# Patient Record
Sex: FEMALE | Race: WHITE | NOT HISPANIC OR LATINO
[De-identification: names, ages, dates, MRNs, and addresses within clinical notes are randomized per-mention and may not be internally consistent; named-entity substitution may affect disease eponyms.]

---

## 2019-04-03 ENCOUNTER — ASOB RESULT (OUTPATIENT)
Age: 29
End: 2019-04-03

## 2019-04-03 ENCOUNTER — APPOINTMENT (OUTPATIENT)
Dept: ANTEPARTUM | Facility: CLINIC | Age: 29
End: 2019-04-03
Payer: COMMERCIAL

## 2019-04-03 PROCEDURE — 76801 OB US < 14 WKS SINGLE FETUS: CPT

## 2019-04-03 PROCEDURE — 36416 COLLJ CAPILLARY BLOOD SPEC: CPT

## 2019-04-03 PROCEDURE — 76813 OB US NUCHAL MEAS 1 GEST: CPT

## 2019-06-02 ENCOUNTER — OUTPATIENT (OUTPATIENT)
Dept: OUTPATIENT SERVICES | Facility: HOSPITAL | Age: 29
LOS: 1 days | Discharge: HOME | End: 2019-06-02
Payer: MEDICARE

## 2019-06-02 VITALS
RESPIRATION RATE: 16 BRPM | HEART RATE: 68 BPM | DIASTOLIC BLOOD PRESSURE: 58 MMHG | SYSTOLIC BLOOD PRESSURE: 117 MMHG | TEMPERATURE: 99 F

## 2019-06-02 DIAGNOSIS — Z90.49 ACQUIRED ABSENCE OF OTHER SPECIFIED PARTS OF DIGESTIVE TRACT: Chronic | ICD-10-CM

## 2019-06-02 LAB
APPEARANCE UR: ABNORMAL
BILIRUB UR-MCNC: NEGATIVE — SIGNIFICANT CHANGE UP
COLOR SPEC: YELLOW — SIGNIFICANT CHANGE UP
DIFF PNL FLD: ABNORMAL
EPI CELLS # UR: ABNORMAL /HPF
GLUCOSE UR QL: NEGATIVE MG/DL — SIGNIFICANT CHANGE UP
KETONES UR-MCNC: NEGATIVE — SIGNIFICANT CHANGE UP
LEUKOCYTE ESTERASE UR-ACNC: ABNORMAL
NITRITE UR-MCNC: NEGATIVE — SIGNIFICANT CHANGE UP
PH UR: 7 — SIGNIFICANT CHANGE UP (ref 5–8)
PROT UR-MCNC: ABNORMAL MG/DL
RBC CASTS # UR COMP ASSIST: ABNORMAL /HPF
SP GR SPEC: 1.02 — SIGNIFICANT CHANGE UP (ref 1.01–1.03)
UROBILINOGEN FLD QL: 0.2 MG/DL — SIGNIFICANT CHANGE UP (ref 0.2–0.2)
WBC UR QL: SIGNIFICANT CHANGE UP /HPF

## 2019-06-02 PROCEDURE — 76817 TRANSVAGINAL US OBSTETRIC: CPT | Mod: 26

## 2019-06-02 PROCEDURE — 99213 OFFICE O/P EST LOW 20 MIN: CPT | Mod: 25

## 2019-06-02 NOTE — OB PROVIDER TRIAGE NOTE - NSOBPROVIDERNOTE_OBGYN_ALL_OB_FT
29yo  at 20w3d GA, GBS unknown, Rh pos, with reassuring maternal and fetal status, with vaginal bleeding, now resolved, likely secondary to low lying placenta, not in  labor   - d/c home   - pelvic rest   -  labor precautions   - f/u UA, UCx, GC/Cl   - f/u next scheduled appt     Dr. Palmer and Dr. Nevarez aware. 29yo  at 20w3d GA, GBS unknown, Rh pos, with reassuring maternal and fetal status, with vaginal bleeding, now resolved, likely secondary to low lying placenta, not in  labor   - d/c home   - pelvic rest   -  labor precautions   - f/u UA, UCx, GC/Cl   - f/u next scheduled appt     Dr. Palmer and Dr. Nevarez aware.  As above, patient evaluated by me as well, low lying placenta, rh +, precautions reiterated, gc/ct/ucx sent, patient to call for results

## 2019-06-02 NOTE — OB PROVIDER TRIAGE NOTE - HISTORY OF PRESENT ILLNESS
29yo  at 20w3d GA, by LMP c/w 1st trim sono, c/o bright red vaginal bleeding, noted on her underwear on waking and then in the toilet bowl. Soaked less than 1/4 pad in 2 hours, currently denies active bleeding. C/o occasional lower abdominal cramping, not painful, not exacerbated. Denies LOF. Good FM. Denies fever, chills, nausea/vomiting, diarrhea/constipation, dysuria, urgency, frequency. Denies dizziness/lightheadedness/CP/SOB/palpitations. Last intercourse yesterday. Last BM yesterday. Last PO intake at . Has not yet had anatomy scan. Denies other complications during this pregnancy.

## 2019-06-02 NOTE — OB PROVIDER TRIAGE NOTE - NS_OBGYNHISTORY_OBGYN_ALL_OB_FT
ObHx: etopX1, no D&C     GynHx: h/o abnormal pap years ago, normal paps since then. Denies h/o fibroids, ovarian cysts, STIs.

## 2019-06-02 NOTE — OB PROVIDER TRIAGE NOTE - NSHPPHYSICALEXAM_GEN_ALL_CORE
Vital Signs Last 24 Hrs  T(C): 37.2 (02 Jun 2019 07:38), Max: 37.2 (02 Jun 2019 07:38)  T(F): 99 (02 Jun 2019 07:38), Max: 99 (02 Jun 2019 07:38)  HR: 68 (02 Jun 2019 07:38) (68 - 68)  BP: 117/58 (02 Jun 2019 07:38) (117/58 - 117/58)  RR: 16 (02 Jun 2019 07:38) (16 - 16)    EFM/Ulmer: deferred     Udip: pos nitrites, mod leuks, large blood     Abd: soft, gravid, nontender, no palpable ctx  Speculum: no active bleeding per os, cervix appears normal, appears closed   SVE: deferred

## 2019-06-03 LAB
C TRACH RRNA SPEC QL NAA+PROBE: SIGNIFICANT CHANGE UP
CULTURE RESULTS: SIGNIFICANT CHANGE UP
N GONORRHOEA RRNA SPEC QL NAA+PROBE: SIGNIFICANT CHANGE UP
SPECIMEN SOURCE: SIGNIFICANT CHANGE UP
SPECIMEN SOURCE: SIGNIFICANT CHANGE UP

## 2019-06-05 ENCOUNTER — APPOINTMENT (OUTPATIENT)
Dept: ANTEPARTUM | Facility: CLINIC | Age: 29
End: 2019-06-05
Payer: COMMERCIAL

## 2019-06-05 ENCOUNTER — ASOB RESULT (OUTPATIENT)
Age: 29
End: 2019-06-05

## 2019-06-05 PROCEDURE — 76811 OB US DETAILED SNGL FETUS: CPT

## 2019-06-05 PROCEDURE — 76817 TRANSVAGINAL US OBSTETRIC: CPT | Mod: 59

## 2019-10-15 ENCOUNTER — INPATIENT (INPATIENT)
Facility: HOSPITAL | Age: 29
LOS: 3 days | Discharge: HOME | End: 2019-10-19
Attending: OBSTETRICS & GYNECOLOGY | Admitting: OBSTETRICS & GYNECOLOGY
Payer: COMMERCIAL

## 2019-10-15 VITALS
DIASTOLIC BLOOD PRESSURE: 67 MMHG | SYSTOLIC BLOOD PRESSURE: 107 MMHG | TEMPERATURE: 98 F | HEART RATE: 80 BPM | RESPIRATION RATE: 16 BRPM | WEIGHT: 207.9 LBS | HEIGHT: 64 IN

## 2019-10-15 DIAGNOSIS — Z98.890 OTHER SPECIFIED POSTPROCEDURAL STATES: Chronic | ICD-10-CM

## 2019-10-15 DIAGNOSIS — Z90.49 ACQUIRED ABSENCE OF OTHER SPECIFIED PARTS OF DIGESTIVE TRACT: Chronic | ICD-10-CM

## 2019-10-15 LAB
APPEARANCE UR: ABNORMAL
BACTERIA # UR AUTO: ABNORMAL
BASOPHILS # BLD AUTO: 0.02 K/UL — SIGNIFICANT CHANGE UP (ref 0–0.2)
BASOPHILS NFR BLD AUTO: 0.2 % — SIGNIFICANT CHANGE UP (ref 0–1)
BILIRUB UR-MCNC: NEGATIVE — SIGNIFICANT CHANGE UP
BLD GP AB SCN SERPL QL: SIGNIFICANT CHANGE UP
COLOR SPEC: SIGNIFICANT CHANGE UP
DIFF PNL FLD: NEGATIVE — SIGNIFICANT CHANGE UP
EOSINOPHIL # BLD AUTO: 0.15 K/UL — SIGNIFICANT CHANGE UP (ref 0–0.7)
EOSINOPHIL NFR BLD AUTO: 1.4 % — SIGNIFICANT CHANGE UP (ref 0–8)
EPI CELLS # UR: 3 /HPF — SIGNIFICANT CHANGE UP (ref 0–5)
GLUCOSE BLDC GLUCOMTR-MCNC: 125 MG/DL — HIGH (ref 70–99)
GLUCOSE UR QL: NEGATIVE — SIGNIFICANT CHANGE UP
HCT VFR BLD CALC: 35.8 % — LOW (ref 37–47)
HGB BLD-MCNC: 12.4 G/DL — SIGNIFICANT CHANGE UP (ref 12–16)
HYALINE CASTS # UR AUTO: 1 /LPF — SIGNIFICANT CHANGE UP (ref 0–7)
IMM GRANULOCYTES NFR BLD AUTO: 0.6 % — HIGH (ref 0.1–0.3)
KETONES UR-MCNC: NEGATIVE — SIGNIFICANT CHANGE UP
LEUKOCYTE ESTERASE UR-ACNC: ABNORMAL
LYMPHOCYTES # BLD AUTO: 2.53 K/UL — SIGNIFICANT CHANGE UP (ref 1.2–3.4)
LYMPHOCYTES # BLD AUTO: 23.7 % — SIGNIFICANT CHANGE UP (ref 20.5–51.1)
MCHC RBC-ENTMCNC: 31.8 PG — HIGH (ref 27–31)
MCHC RBC-ENTMCNC: 34.6 G/DL — SIGNIFICANT CHANGE UP (ref 32–37)
MCV RBC AUTO: 91.8 FL — SIGNIFICANT CHANGE UP (ref 81–99)
MONOCYTES # BLD AUTO: 0.76 K/UL — HIGH (ref 0.1–0.6)
MONOCYTES NFR BLD AUTO: 7.1 % — SIGNIFICANT CHANGE UP (ref 1.7–9.3)
NEUTROPHILS # BLD AUTO: 7.16 K/UL — HIGH (ref 1.4–6.5)
NEUTROPHILS NFR BLD AUTO: 67 % — SIGNIFICANT CHANGE UP (ref 42.2–75.2)
NITRITE UR-MCNC: NEGATIVE — SIGNIFICANT CHANGE UP
NRBC # BLD: 0 /100 WBCS — SIGNIFICANT CHANGE UP (ref 0–0)
PH UR: 6.5 — SIGNIFICANT CHANGE UP (ref 5–8)
PLATELET # BLD AUTO: 193 K/UL — SIGNIFICANT CHANGE UP (ref 130–400)
PRENATAL SYPHILIS TEST: SIGNIFICANT CHANGE UP
PROT UR-MCNC: NEGATIVE — SIGNIFICANT CHANGE UP
RBC # BLD: 3.9 M/UL — LOW (ref 4.2–5.4)
RBC # FLD: 13.3 % — SIGNIFICANT CHANGE UP (ref 11.5–14.5)
RBC CASTS # UR COMP ASSIST: 2 /HPF — SIGNIFICANT CHANGE UP (ref 0–4)
SP GR SPEC: 1.01 — LOW (ref 1.01–1.02)
UROBILINOGEN FLD QL: SIGNIFICANT CHANGE UP
WBC # BLD: 10.68 K/UL — SIGNIFICANT CHANGE UP (ref 4.8–10.8)
WBC # FLD AUTO: 10.68 K/UL — SIGNIFICANT CHANGE UP (ref 4.8–10.8)
WBC UR QL: 120 /HPF — HIGH (ref 0–5)

## 2019-10-15 RX ORDER — SODIUM CHLORIDE 9 MG/ML
1000 INJECTION, SOLUTION INTRAVENOUS
Refills: 0 | Status: DISCONTINUED | OUTPATIENT
Start: 2019-10-15 | End: 2019-10-17

## 2019-10-15 RX ORDER — INFLUENZA VIRUS VACCINE 15; 15; 15; 15 UG/.5ML; UG/.5ML; UG/.5ML; UG/.5ML
0.5 SUSPENSION INTRAMUSCULAR ONCE
Refills: 0 | Status: COMPLETED | OUTPATIENT
Start: 2019-10-15 | End: 2019-10-19

## 2019-10-15 RX ORDER — OXYTOCIN 10 UNIT/ML
41.67 VIAL (ML) INJECTION
Qty: 20 | Refills: 0 | Status: DISCONTINUED | OUTPATIENT
Start: 2019-10-15 | End: 2019-10-17

## 2019-10-15 RX ORDER — AMPICILLIN TRIHYDRATE 250 MG
2 CAPSULE ORAL ONCE
Refills: 0 | Status: COMPLETED | OUTPATIENT
Start: 2019-10-15 | End: 2019-10-15

## 2019-10-15 RX ORDER — DINOPROSTONE 10 MG/241MG
10 INSERT VAGINAL ONCE
Refills: 0 | Status: COMPLETED | OUTPATIENT
Start: 2019-10-15 | End: 2019-10-15

## 2019-10-15 RX ORDER — AMPICILLIN TRIHYDRATE 250 MG
1 CAPSULE ORAL EVERY 4 HOURS
Refills: 0 | Status: DISCONTINUED | OUTPATIENT
Start: 2019-10-15 | End: 2019-10-17

## 2019-10-15 RX ORDER — HUMAN INSULIN 100 [IU]/ML
4 INJECTION, SUSPENSION SUBCUTANEOUS ONCE
Refills: 0 | Status: COMPLETED | OUTPATIENT
Start: 2019-10-15 | End: 2019-10-15

## 2019-10-15 RX ORDER — VALACYCLOVIR 500 MG/1
500 TABLET, FILM COATED ORAL
Refills: 0 | Status: DISCONTINUED | OUTPATIENT
Start: 2019-10-15 | End: 2019-10-15

## 2019-10-15 RX ADMIN — Medication 216 GRAM(S): at 23:11

## 2019-10-15 RX ADMIN — HUMAN INSULIN 4 UNIT(S): 100 INJECTION, SUSPENSION SUBCUTANEOUS at 23:10

## 2019-10-15 RX ADMIN — SODIUM CHLORIDE 125 MILLILITER(S): 9 INJECTION, SOLUTION INTRAVENOUS at 21:45

## 2019-10-15 RX ADMIN — DINOPROSTONE 10 MILLIGRAM(S): 10 INSERT VAGINAL at 23:02

## 2019-10-15 NOTE — OB PROVIDER H&P - NSHPLABSRESULTS_GEN_ALL_CORE
02/21/19  Measles immune  Mumps immune    6/29/19  182mg/dL    7/21/19  GTT: 72/109/108/53    [place]  @7.6wks SIUP, CRL 7.6wks, FH+, nl ovaries  @11.6wks: NT 1.4m  @20.6wks: EFW 79%, post placenta, AFV nl, cvx 3.5cm, normal fetal anatomy  @28wks: EFW 1269g (65%), MVP 4.3cm,  @32wks: EFW 1942g (48%)  @35.4wks: EFW 2822g (61%), MVP 3.7cm  @36.4wks: MVP 6cm  @37wks: MVP 5cm  @38wks: MVP 4.5cm, EFW 3669g (84%), vtx 02/21/19  Measles immune  Mumps immune    6/29/19  GCT: 182mg/dL    7/21/19  GTT: 72/109/108/53    [place]  @7.6wks SIUP, CRL 7.6wks, FH+, nl ovaries  @11.6wks: NT 1.4m  @20.6wks: EFW 79%, post placenta, AFV nl, cvx 3.5cm, normal fetal anatomy  @28wks: EFW 1269g (65%), MVP 4.3cm,  @32wks: EFW 1942g (48%)  @35.4wks: EFW 2822g (61%), MVP 3.7cm  @36.4wks: MVP 6cm  @37wks: MVP 5cm  @38wks: MVP 4.5cm, EFW 3669g (84%), vtx

## 2019-10-15 NOTE — OB PROVIDER H&P - NS_OBGYNHISTORY_OBGYN_ALL_OB_FT
OB Hx  ETOP x 1    GYN Hx  HSV-I pos, on valtrex  H/O HPV, s/p colpo on 11/18- biopsy neg OB Hx  ETOP x 1 in 2008 with D&C    GYN Hx  HSV-I pos, on valtrex BID since 36 weeks, last outbreak 01/2019  H/O HPV, s/p colpo on 11/18- biopsy neg  Denies ovarian cysts, fibroids.

## 2019-10-15 NOTE — OB PROVIDER H&P - HISTORY OF PRESENT ILLNESS
30yo  @39w5d, REYNA 10/17/19 by LMP c/w first trimester sonogram presents to L&D for IOL for GDMA2 on insulin. 28yo  @39w5d, REYNA 10/17/19 by LMP c/w first trimester sonogram presents to L&D for IOL for GDMA2 on insulin. She reports fasting FS are 70-80, 2hr PP range 110-140, she takes NPH 8 units in the morning, and 8 units in bedtime. Her last dose was this morning. She denies contractions, VB, LOF. Reports good FM. She has h/o HSV-1, last outbreak was during the beginning of this pregnancy, she took Valtrex for a few weeks. She denies current burning sensation or outbreaks. She resumed Valtrex at 36wks, she takes it BID, she already took both doses today. She denies any other issues during this gestation.

## 2019-10-15 NOTE — OB PROVIDER H&P - ASSESSMENT
28yo  @39w5d, GBS pos, h/o HSV-1 on valtrex, h/o HPV pos s/p colpo with neg biopsy, GDMA2 on insulin, for IOL    -cervidil for IOL  -admit to L&D  -admission labs  -clear liquids, IV hydration  -continuous EFM/toco  -FS q4 hrs while in latent labor  -Valtrex ordered  -pain management PRN  -Sliding scale for blood glucose control    Dr. Jorge and Dr. Cohn to be made aware 30yo  @39w5d, GBS pos, h/o HSV-1 on valtrex, h/o HPV pos s/p colpo with neg biopsy, GDMA2 on insulin, for IOL    -cervidil for IOL  -admit to L&D  -admission labs  -clear liquids, IV hydration  -continuous EFM/toco  -FS q4 hrs while in latent labor  -pain management PRN  -Sliding scale for blood glucose control    Dr. Jorge and Dr. Cohn to be made aware

## 2019-10-15 NOTE — OB PROVIDER H&P - NS_PELVICEXAM_OBGYN_ALL_OB
[facility] - 19 EGA - 28.0     - Report received from PUJA Ashley RN. Pt resting comfortably at this time.    Dr. Ramirez at bedside, discussed POC with pt. Pt agrees.   Pt states she was able to tolerate saltines at this time. Pt states her nausea has come back a little but she has not vomited during this shift.   Dr. Ramirez reviewed labs at this time, states pt may be discharged home at this time.   IV removed. Discharge instructions given at this time, including when to return to the hospital. Pt verbalizes understanding at this time, all questions answered.   Pt ambulated off the unit in stable condition at this time with personal belongings in place.   Yes

## 2019-10-15 NOTE — OB PROVIDER H&P - NSHPPHYSICALEXAM_GEN_ALL_CORE
Vital Signs Last 24 Hrs  T(C): 36.9 (15 Oct 2019 21:36), Max: 36.9 (15 Oct 2019 21:36)  T(F): 98.4 (15 Oct 2019 21:36), Max: 98.4 (15 Oct 2019 21:36)  HR: 64 (15 Oct 2019 22:15) (64 - 80)  BP: 107/61 (15 Oct 2019 22:15) (107/6 - 107/61)  RR: 16 (15 Oct 2019 21:36) (16 - 16)    POCT Blood Glucose.: 125 mg/dL (15 Oct 2019 21:33)    Gen: AAOx3, NAD  Abd: soft, nontender, gravid, no palpable contractions  EFM: 130/mod petty/+accels  [place]: no contractions  SVE: 0/0/-3, vtx, intact   Speculum: physiologic discharge, cvx closed, no lesions noted  Sono: cephalic  EFW by Leopold's: 8lb

## 2019-10-16 PROBLEM — Z78.9 OTHER SPECIFIED HEALTH STATUS: Chronic | Status: ACTIVE | Noted: 2019-06-02

## 2019-10-16 LAB
GLUCOSE BLDC GLUCOMTR-MCNC: 105 MG/DL — HIGH (ref 70–99)
GLUCOSE BLDC GLUCOMTR-MCNC: 108 MG/DL — HIGH (ref 70–99)
GLUCOSE BLDC GLUCOMTR-MCNC: 68 MG/DL — LOW (ref 70–99)
GLUCOSE BLDC GLUCOMTR-MCNC: 71 MG/DL — SIGNIFICANT CHANGE UP (ref 70–99)
GLUCOSE BLDC GLUCOMTR-MCNC: 77 MG/DL — SIGNIFICANT CHANGE UP (ref 70–99)

## 2019-10-16 RX ORDER — ACETAMINOPHEN 500 MG
650 TABLET ORAL EVERY 6 HOURS
Refills: 0 | Status: DISCONTINUED | OUTPATIENT
Start: 2019-10-16 | End: 2019-10-16

## 2019-10-16 RX ORDER — ACETAMINOPHEN 500 MG
650 TABLET ORAL ONCE
Refills: 0 | Status: COMPLETED | OUTPATIENT
Start: 2019-10-16 | End: 2019-10-16

## 2019-10-16 RX ORDER — BUTORPHANOL TARTRATE 2 MG/ML
2 INJECTION, SOLUTION INTRAMUSCULAR; INTRAVENOUS ONCE
Refills: 0 | Status: DISCONTINUED | OUTPATIENT
Start: 2019-10-16 | End: 2019-10-16

## 2019-10-16 RX ORDER — DIPHENHYDRAMINE HCL 50 MG
25 CAPSULE ORAL ONCE
Refills: 0 | Status: COMPLETED | OUTPATIENT
Start: 2019-10-16 | End: 2019-10-16

## 2019-10-16 RX ORDER — DINOPROSTONE 10 MG/241MG
10 INSERT VAGINAL ONCE
Refills: 0 | Status: COMPLETED | OUTPATIENT
Start: 2019-10-16 | End: 2019-10-16

## 2019-10-16 RX ADMIN — Medication 108 GRAM(S): at 15:00

## 2019-10-16 RX ADMIN — Medication 108 GRAM(S): at 06:58

## 2019-10-16 RX ADMIN — Medication 108 GRAM(S): at 18:48

## 2019-10-16 RX ADMIN — Medication 25 MILLIGRAM(S): at 21:46

## 2019-10-16 RX ADMIN — DINOPROSTONE 10 MILLIGRAM(S): 10 INSERT VAGINAL at 15:17

## 2019-10-16 RX ADMIN — Medication 108 GRAM(S): at 02:59

## 2019-10-16 RX ADMIN — BUTORPHANOL TARTRATE 2 MILLIGRAM(S): 2 INJECTION, SOLUTION INTRAMUSCULAR; INTRAVENOUS at 21:45

## 2019-10-16 RX ADMIN — BUTORPHANOL TARTRATE 2 MILLIGRAM(S): 2 INJECTION, SOLUTION INTRAMUSCULAR; INTRAVENOUS at 23:46

## 2019-10-16 RX ADMIN — Medication 108 GRAM(S): at 11:06

## 2019-10-16 RX ADMIN — Medication 108 GRAM(S): at 22:54

## 2019-10-16 RX ADMIN — Medication 650 MILLIGRAM(S): at 02:48

## 2019-10-16 NOTE — PROGRESS NOTE ADULT - SUBJECTIVE AND OBJECTIVE BOX
PGY-2 Note    Patient seen at bedside. No complaints at the moment.    T(F): 98.42 (10-16 @ 15:07), Max: 98.42 (10-16 @ 15:07)  HR: 76 (10-16 @ 15:05)  BP: 102/- (10-16 @ 15:05) (95/53 - 120/64)  RR: 18 (10-16 @ 15:07)  EFM: 120bpm/mod petty/ accels+  TOCO: irregular  SVE: last exam a 1100, s/p Cervadil #1, 1/long/-3    Medications:  (Floorstock): 2 (10-16 @ 02:40)  acetaminophen   Tablet ..: 650 (10-16 @ 02:48)  ampicillin  IVPB: 216 (10-15 @ 23:11)  ampicillin  IVPB: 108 (10-16 @ 15:00),  108 (10-16 @ 11:06),  108 (10-16 @ 06:58),  108 (10-16 @ 02:59)  dinoprostone Insert: 10 (10-15 @ 23:02)  insulin NPH human recombinant: 4 (10-15 @ 23:10)  lactated ringers.: 125 (10-15 @ 21:37)      Labs:                        12.4   10.68 )-----------( 193      ( 15 Oct 2019 21:38 )             35.8           Prenatal Syphilis Test: Nonreact (10-15 @ 21:38)  Antibody Screen: NEG (10-15-19 @ 21:38)    Urinalysis Basic - ( 15 Oct 2019 21:38 )  Color: Light Yellow / Appearance: Slightly Turbid / S.008 / pH: x  Gluc: x / Ketone: Negative  / Bili: Negative / Urobili: <2 mg/dL   Blood: x / Protein: Negative / Nitrite: Negative   Leuk Esterase: Large / RBC: 2 /HPF /  /HPF   Sq Epi: x / Non Sq Epi: 3 /HPF / Bacteria: Moderate

## 2019-10-16 NOTE — PROGRESS NOTE ADULT - SUBJECTIVE AND OBJECTIVE BOX
PGY1 Note    Patient seen at bedside for evaluation of labor progression and evaluation of FHR baseline between 100-110. She was given fluid bolus and repositioned, with good increase in baseline to 110-115. Doing well, no complaints.     Vital Signs Last 24 Hrs  T(C): 36.9 (15 Oct 2019 21:36), Max: 36.9 (15 Oct 2019 21:36)  T(F): 98.4 (15 Oct 2019 21:36), Max: 98.4 (15 Oct 2019 21:36)  HR: 53 (16 Oct 2019 02:46) (53 - 80)  BP: 100/58 (16 Oct 2019 02:46) (100/58 - 120/64)  RR: 16 (15 Oct 2019 21:36) (16 - 16)    EFM: 110/mod petty/+accels  TOCO: occasional contractions  SVE: 0/0/-3, vtx by sono, intact @2200    Medications:  (Floorstock): 2 Each (10-16-19 @ 02:40)  acetaminophen   Tablet ..: 650 milliGRAM(s) (10-16-19 @ 02:48)  ampicillin  IVPB: 216 mL/Hr (10-15-19 @ 23:11)  ampicillin  IVPB: 108 mL/Hr (10-16-19 @ 02:59)  dinoprostone Insert: 10 milliGRAM(s) (10-15-19 @ 23:02)  insulin NPH human recombinant: 4 Unit(s) (10-15-19 @ 23:10)  lactated ringers.: 125 mL/Hr (10-15-19 @ 21:37)      Labs:                        12.4   10.68 )-----------( 193      ( 15 Oct 2019 21:38 )             35.8           ABO RH Interpretation: B POS (10-15-19 @ 21:38)    Antibody Screen: NEG (10-15-19 @ 21:38)    Urinalysis Basic - ( 15 Oct 2019 21:38 )    Color: Light Yellow / Appearance: Slightly Turbid / S.008 / pH: x  Gluc: x / Ketone: Negative  / Bili: Negative / Urobili: <2 mg/dL   Blood: x / Protein: Negative / Nitrite: Negative   Leuk Esterase: Large / RBC: 2 /HPF /  /HPF   Sq Epi: x / Non Sq Epi: 3 /HPF / Bacteria: Moderate      Prenatal Syphilis Test: Nonreact (10-15-19 @ 21:38)      POCT Blood Glucose.: 77 mg/dL (16 Oct 2019 03:34)  POCT Blood Glucose.: 77 mg/dL (16 Oct 2019 01:21)  POCT Blood Glucose.: 125 mg/dL (15 Oct 2019 21:33)

## 2019-10-16 NOTE — CHART NOTE - NSCHARTNOTEFT_GEN_A_CORE
Weds  10/16/2019  16:38    Patient's chart, notes and labs reviewed.      IOL for GDMA2 on Insulin  h/p HSV-1 on Valtrex  s/p Cervidil #2 @ 15:00  1/Long/-3    Comfortable                          12.4   10.68 )-----------( 193      ( 15 Oct 2019 21:38 )             35.8     Admission labs acceptable for consideration of Neuraxial Nerve Block for Labor Analgesia/Anesthesia.  Patient has not requested for any Neuraxial Analgesia yet.  Will remain readily available. Weds  10/16/2019  16:38    Patient's chart, notes and labs reviewed.      39 + 5 weeks  IOL for GDMA2 on Insulin  h/p HSV-1 on Valtrex  s/p Cervidil #2 @ 15:00  /Long/-3    Comfortable                          12.4   10.68 )-----------( 193      ( 15 Oct 2019 21:38 )             35.8     Admission labs acceptable for consideration of Neuraxial Nerve Block for Labor Analgesia/Anesthesia.  Patient has not requested for any Neuraxial Analgesia yet.  Will remain readily available.

## 2019-10-16 NOTE — PROGRESS NOTE ADULT - ASSESSMENT
30yo  @39w6d, GBS pos, h/o HSV-1 on valtrex, h/o HPV pos s/p colpo with neg biopsy, GDMA2 on insulin, for IOL, s/p Cervidil removal.  -Cervadil #2 at 1500  -Continue current management   -Pain management prn  -Continuous EFM/toco  -FS q4 hours   -Reevaluate     Dr. Downey aware, Dr. Castro aware

## 2019-10-16 NOTE — PROGRESS NOTE ADULT - ASSESSMENT
30yo  @39w6d, GBS pos, h/o HSV-1 on valtrex, h/o HPV pos s/p colpo with neg biopsy, GDMA2 on insulin, for IOL, s/p Cervidil insertion    -clear liquids, IV hydration  -continuous EFM/toco  -FS q4 hrs while in latent labor  -pain management PRN  -Sliding scale for blood glucose control    Dr. Jorge and Dr. Cohn aware

## 2019-10-16 NOTE — PROGRESS NOTE ADULT - SUBJECTIVE AND OBJECTIVE BOX
Patient seen at bedside, resting comfortably. No complaints at this time. IOL for GDMA2.     T(C): 36.5 (10-16-19 @ 07:21), Max: 36.9 (10-15-19 @ 21:36)  HR: 65 (10-16-19 @ 10:45) (53 - 80)  BP: 112/65 (10-16-19 @ 10:45) (95/53 - 120/64)  RR: 18 (10-16-19 @ 07:21) (16 - 18)    EFM: 130/mod/+accels  TOCO: q 4-5 mins  SVE: cervidil removed without difficulty. 1/long/-3    Meds:  ampicillin  IVPB 1 Gram(s) IV Intermittent every 4 hours  influenza   Vaccine 0.5 milliLiter(s) IntraMuscular once  lactated ringers. 1000 milliLiter(s) IV Continuous <Continuous>  oxytocin Infusion 41.667 milliUNIT(s)/Min IV Continuous <Continuous>      Labs:  none new    A/P: +  28yo  @39w6d, GBS pos, h/o HSV-1 on valtrex, h/o HPV pos s/p colpo with neg biopsy, GDMA2 on insulin, for IOL, s/p Cervidil insertion    Plan:  Continue EFM/TOCO  Continue IV hydration  Continue IV antibiotics  FS q4 hrs while in latent labor  Ambulate, shower, eat  Pain management PRN  Sliding scale for blood glucose control  Re-insert Cervidil #2 in the afternoon when Dr. Downey arrives    Dr. Downey aware

## 2019-10-17 LAB
GLUCOSE BLDC GLUCOMTR-MCNC: 104 MG/DL — HIGH (ref 70–99)
GLUCOSE BLDC GLUCOMTR-MCNC: 69 MG/DL — LOW (ref 70–99)
GLUCOSE BLDC GLUCOMTR-MCNC: 76 MG/DL — SIGNIFICANT CHANGE UP (ref 70–99)
GLUCOSE BLDC GLUCOMTR-MCNC: 79 MG/DL — SIGNIFICANT CHANGE UP (ref 70–99)

## 2019-10-17 PROCEDURE — 59400 OBSTETRICAL CARE: CPT

## 2019-10-17 RX ORDER — ONDANSETRON 8 MG/1
4 TABLET, FILM COATED ORAL EVERY 6 HOURS
Refills: 0 | Status: DISCONTINUED | OUTPATIENT
Start: 2019-10-17 | End: 2019-10-17

## 2019-10-17 RX ORDER — LANOLIN
1 OINTMENT (GRAM) TOPICAL EVERY 6 HOURS
Refills: 0 | Status: DISCONTINUED | OUTPATIENT
Start: 2019-10-17 | End: 2019-10-19

## 2019-10-17 RX ORDER — GLYCERIN ADULT
1 SUPPOSITORY, RECTAL RECTAL AT BEDTIME
Refills: 0 | Status: DISCONTINUED | OUTPATIENT
Start: 2019-10-17 | End: 2019-10-19

## 2019-10-17 RX ORDER — NALOXONE HYDROCHLORIDE 4 MG/.1ML
0.1 SPRAY NASAL
Refills: 0 | Status: DISCONTINUED | OUTPATIENT
Start: 2019-10-17 | End: 2019-10-17

## 2019-10-17 RX ORDER — DIPHENHYDRAMINE HCL 50 MG
25 CAPSULE ORAL EVERY 6 HOURS
Refills: 0 | Status: DISCONTINUED | OUTPATIENT
Start: 2019-10-17 | End: 2019-10-19

## 2019-10-17 RX ORDER — OXYCODONE HYDROCHLORIDE 5 MG/1
5 TABLET ORAL ONCE
Refills: 0 | Status: DISCONTINUED | OUTPATIENT
Start: 2019-10-17 | End: 2019-10-19

## 2019-10-17 RX ORDER — ACETAMINOPHEN 500 MG
975 TABLET ORAL
Refills: 0 | Status: DISCONTINUED | OUTPATIENT
Start: 2019-10-17 | End: 2019-10-19

## 2019-10-17 RX ORDER — MAGNESIUM HYDROXIDE 400 MG/1
30 TABLET, CHEWABLE ORAL
Refills: 0 | Status: DISCONTINUED | OUTPATIENT
Start: 2019-10-17 | End: 2019-10-19

## 2019-10-17 RX ORDER — ACETAMINOPHEN 500 MG
650 TABLET ORAL ONCE
Refills: 0 | Status: COMPLETED | OUTPATIENT
Start: 2019-10-17 | End: 2019-10-17

## 2019-10-17 RX ORDER — DIBUCAINE 1 %
1 OINTMENT (GRAM) RECTAL EVERY 6 HOURS
Refills: 0 | Status: DISCONTINUED | OUTPATIENT
Start: 2019-10-17 | End: 2019-10-19

## 2019-10-17 RX ORDER — AER TRAVELER 0.5 G/1
1 SOLUTION RECTAL; TOPICAL EVERY 4 HOURS
Refills: 0 | Status: DISCONTINUED | OUTPATIENT
Start: 2019-10-17 | End: 2019-10-19

## 2019-10-17 RX ORDER — DOCUSATE SODIUM 100 MG
100 CAPSULE ORAL
Refills: 0 | Status: DISCONTINUED | OUTPATIENT
Start: 2019-10-17 | End: 2019-10-19

## 2019-10-17 RX ORDER — OXYTOCIN 10 UNIT/ML
41.67 VIAL (ML) INJECTION
Qty: 20 | Refills: 0 | Status: DISCONTINUED | OUTPATIENT
Start: 2019-10-17 | End: 2019-10-19

## 2019-10-17 RX ORDER — IBUPROFEN 200 MG
600 TABLET ORAL EVERY 6 HOURS
Refills: 0 | Status: COMPLETED | OUTPATIENT
Start: 2019-10-17 | End: 2020-09-14

## 2019-10-17 RX ORDER — SIMETHICONE 80 MG/1
80 TABLET, CHEWABLE ORAL EVERY 4 HOURS
Refills: 0 | Status: DISCONTINUED | OUTPATIENT
Start: 2019-10-17 | End: 2019-10-19

## 2019-10-17 RX ORDER — BENZOCAINE 10 %
1 GEL (GRAM) MUCOUS MEMBRANE EVERY 6 HOURS
Refills: 0 | Status: DISCONTINUED | OUTPATIENT
Start: 2019-10-17 | End: 2019-10-19

## 2019-10-17 RX ORDER — HYDROCORTISONE 1 %
1 OINTMENT (GRAM) TOPICAL EVERY 6 HOURS
Refills: 0 | Status: DISCONTINUED | OUTPATIENT
Start: 2019-10-17 | End: 2019-10-19

## 2019-10-17 RX ORDER — OXYTOCIN 10 UNIT/ML
2 VIAL (ML) INJECTION
Qty: 30 | Refills: 0 | Status: DISCONTINUED | OUTPATIENT
Start: 2019-10-17 | End: 2019-10-17

## 2019-10-17 RX ORDER — OXYCODONE HYDROCHLORIDE 5 MG/1
5 TABLET ORAL
Refills: 0 | Status: DISCONTINUED | OUTPATIENT
Start: 2019-10-17 | End: 2019-10-19

## 2019-10-17 RX ORDER — KETOROLAC TROMETHAMINE 30 MG/ML
30 SYRINGE (ML) INJECTION ONCE
Refills: 0 | Status: DISCONTINUED | OUTPATIENT
Start: 2019-10-17 | End: 2019-10-17

## 2019-10-17 RX ADMIN — Medication 108 GRAM(S): at 03:00

## 2019-10-17 RX ADMIN — Medication 108 GRAM(S): at 14:55

## 2019-10-17 RX ADMIN — Medication 2 MILLIUNIT(S)/MIN: at 07:40

## 2019-10-17 RX ADMIN — Medication 108 GRAM(S): at 18:47

## 2019-10-17 RX ADMIN — Medication 30 MILLIGRAM(S): at 21:05

## 2019-10-17 RX ADMIN — Medication 650 MILLIGRAM(S): at 17:31

## 2019-10-17 RX ADMIN — Medication 108 GRAM(S): at 10:45

## 2019-10-17 RX ADMIN — Medication 30 MILLIGRAM(S): at 21:53

## 2019-10-17 RX ADMIN — Medication 650 MILLIGRAM(S): at 18:49

## 2019-10-17 RX ADMIN — Medication 108 GRAM(S): at 06:40

## 2019-10-17 NOTE — PROGRESS NOTE ADULT - SUBJECTIVE AND OBJECTIVE BOX
Patient evaluated at bedside, doing well. no complaints.     T(F): 97.88 (07:28)  HR: 75 (10:14)  BP: 101/55 (10:06)  RR: 16 (07:28)    SVE:  3-4/80/-1 @ 0720 by Dr. Downey  EFM:  120 moderate variability, Cat I  TOCO:  q 3-5 min    Medications:  Pitocin @ 12 milliunits        Labs:                        12.4   10.68 )-----------( 193      ( 15 Oct 2019 21:38 )             35.8             Urinalysis Basic - ( 15 Oct 2019 21:38 )    Color: Light Yellow / Appearance: Slightly Turbid / S.008 / pH: x  Gluc: x / Ketone: Negative  / Bili: Negative / Urobili: <2 mg/dL   Blood: x / Protein: Negative / Nitrite: Negative   Leuk Esterase: Large / RBC: 2 /HPF /  /HPF   Sq Epi: x / Non Sq Epi: 3 /HPF / Bacteria: Moderate

## 2019-10-17 NOTE — OB PROVIDER IHI INDUCTION/AUGMENTATION NOTE - NS_OBIHIREPANPSATTEST_OBGYN_ALL_OB
I have discussed with the Attending the patient's status, as well as the H&P and the fetal status. The Attending agreed with the suggested management.
I have discussed with the Attending the patient's status, as well as the H&P and the fetal status. The Attending agreed with the suggested management.

## 2019-10-17 NOTE — PROGRESS NOTE ADULT - SUBJECTIVE AND OBJECTIVE BOX
Ob Attending Progress    Patient seen at bedside for evaluation of labor progression.  Reports Comfortable s/p epidural.    T(F): 98.24 (05:45)  HR: 88 (07:24)  BP: 100/71 (07:06)  RR: 18 (05:45)  EFM:  accels, no decels, moderate varaibility, Category 1   TOCO: IRREG ctx   SVE: CVX balloon now coming throught cvx  cvx 3-4 cm /80%/-2   Arom clear fluid     Labs:                        12.4   10.68 )-----------( 193      ( 15 Oct 2019 21:38 )             35.8       Am fs-76      Urinalysis Basic - ( 15 Oct 2019 21:38 )    Color: Light Yellow / Appearance: Slightly Turbid / S.008 / pH: x  Gluc: x / Ketone: Negative  / Bili: Negative / Urobili: <2 mg/dL   Blood: x / Protein: Negative / Nitrite: Negative   Leuk Esterase: Large / RBC: 2 /HPF /  /HPF   Sq Epi: x / Non Sq Epi: 3 /HPF / Bacteria: Moderate      Meds: ampicillin  IVPB 1 Gram(s) IV Intermittent every 4 hours  influenza   Vaccine 0.5 milliLiter(s) IntraMuscular once  lactated ringers. 1000 milliLiter(s) IV Continuous <Continuous>  oxytocin Infusion 2 milliUNIT(s)/Min IV Continuous <Continuous>  oxytocin Infusion 41.667 milliUNIT(s)/Min IV Continuous <Continuous>      A/P:  29y/0 at 40wks, gBS CX +,  now, undergoing IOL for GDM A2, s/p cervidil x 2, and cvx balloon , now s/p AROM.   - begin oxytocin augmentation  - continue Abx  - SCD's  -follow fhr pattern, re-evaluate 2-3 hrs

## 2019-10-17 NOTE — OB PROVIDER DELIVERY SUMMARY - NSPROVIDERDELIVERYNOTE_OBGYN_ALL_OB_FT
Patient was fully dilated and pushed. NSD live male , Apgars 9/9. Vtx delivered  OA  ,  Fetal head restituted to LOT. The anterior and posterior shoulders delivered, followed by the remaining body atraumatically. Delayed cord clamping was performed, and then clamped and cut. Cord blood & gases collected. The  was handed to mother for skin to skin. The placenta delivered spontaneously intact with 3v cord. Uterus massaged and firm with oxytocin given IV. Clots evacuated from uterus and bimanual exam. Cervix, vagina and perineum inspected   . Repair of   first degree vaginal laceration , in the usual fashion with 2-0 chromic.   EBL -450cc, hemostasis assured.

## 2019-10-17 NOTE — PROGRESS NOTE ADULT - SUBJECTIVE AND OBJECTIVE BOX
PGY1 Note    Patient seen at bedside. No complaints at the moment.    T(F): 98.24 (10-17 @ 14:00), Max: 98.42 (10-16 @ 23:06)  HR: 76 (10-17 @ 17:09)  BP: 105/54 (10-17 @ 17:05) (89/53 - 173/83)  RR: 16 (10-17 @ 14:00)    EFM: 125bpm/moderate variability/+accels  TOCO: q2-4mins  SVE: deferred    Medications:  acetaminophen   Tablet ..: 650 (10-16 @ 02:48)  ampicillin  IVPB: 216 (10-15 @ 23:11)  ampicillin  IVPB: 108 (10-17 @ 14:55),  108 (10-17 @ 10:45),  108 (10-17 @ 06:40),  108 (10-17 @ 03:00),  108 (10-16 @ 22:54),  108 (10-16 @ 18:48),  108 (10-16 @ 15:00),  108 (10-16 @ 11:06),  108 (10-16 @ 06:58),  108 (10-16 @ 02:59)  butorphanol Injectable: 2 (10-16 @ 21:45)  dinoprostone Insert: 10 (10-16 @ 15:17)  dinoprostone Insert: 10 (10-15 @ 23:02)  diphenhydrAMINE   Injectable: 25 (10-16 @ 21:46)  insulin NPH human recombinant: 4 (10-15 @ 23:10)  lactated ringers.: 125 (10-15 @ 21:37)  oxytocin Infusion: 2 (10-17 @ 07:26)      Labs:                        12.4   10.68 )-----------( 193      ( 15 Oct 2019 21:38 )             35.8             Urinalysis Basic - ( 15 Oct 2019 21:38 )    Color: Light Yellow / Appearance: Slightly Turbid / S.008 / pH: x  Gluc: x / Ketone: Negative  / Bili: Negative / Urobili: <2 mg/dL   Blood: x / Protein: Negative / Nitrite: Negative   Leuk Esterase: Large / RBC: 2 /HPF /  /HPF   Sq Epi: x / Non Sq Epi: 3 /HPF / Bacteria: Moderate PGY1 Note    Patient seen at bedside. No complaints at the moment.    T(F): 98.24 (10-17 @ 14:00), Max: 98.42 (10-16 @ 23:06)  HR: 76 (10-17 @ 17:09)  BP: 105/54 (10-17 @ 17:05) (89/53 - 173/83)  RR: 16 (10-17 @ 14:00)    EFM: 125bpm/moderate variability/+accels  TOCO: q2-4mins  SVE: 9/100/0, vtx, ruptured per Dr. Downey    Medications:  acetaminophen   Tablet ..: 650 (10-16 @ 02:48)  ampicillin  IVPB: 216 (10-15 @ 23:11)  ampicillin  IVPB: 108 (10-17 @ 14:55),  108 (10-17 @ 10:45),  108 (10-17 @ 06:40),  108 (10-17 @ 03:00),  108 (10-16 @ 22:54),  108 (10-16 @ 18:48),  108 (10-16 @ 15:00),  108 (10-16 @ 11:06),  108 (10-16 @ 06:58),  108 (10-16 @ 02:59)  butorphanol Injectable: 2 (10-16 @ 21:45)  dinoprostone Insert: 10 (10-16 @ 15:17)  dinoprostone Insert: 10 (10-15 @ 23:02)  diphenhydrAMINE   Injectable: 25 (10-16 @ 21:46)  insulin NPH human recombinant: 4 (10-15 @ 23:10)  lactated ringers.: 125 (10-15 @ 21:37)  oxytocin Infusion: 2 (10-17 @ 07:26)      Labs:                        12.4   10.68 )-----------( 193      ( 15 Oct 2019 21:38 )             35.8             Urinalysis Basic - ( 15 Oct 2019 21:38 )    Color: Light Yellow / Appearance: Slightly Turbid / S.008 / pH: x  Gluc: x / Ketone: Negative  / Bili: Negative / Urobili: <2 mg/dL   Blood: x / Protein: Negative / Nitrite: Negative   Leuk Esterase: Large / RBC: 2 /HPF /  /HPF   Sq Epi: x / Non Sq Epi: 3 /HPF / Bacteria: Moderate

## 2019-10-17 NOTE — PROGRESS NOTE ADULT - ASSESSMENT
A/P:   29y  at 58o9qED, GBS positive, s/p epidural, on pitocin, in labor progressing well.  -Continue current management   -Pain management prn  -Continuous EFM/toco  -F/u pending labs  -Reevaluate     Dr. Castro and Dr. Downey aware

## 2019-10-17 NOTE — PROGRESS NOTE ADULT - SUBJECTIVE AND OBJECTIVE BOX
PGY 4 note    Balloon placed 80/80  SVE: 1/L/-3, vtx, intact  Tracing reactive  No contractions.  Pt desires pain management  for epidural    Dr. Downey aware

## 2019-10-17 NOTE — PROGRESS NOTE ADULT - SUBJECTIVE AND OBJECTIVE BOX
PGY1 Note    Patient seen at bedside. No complaints at the moment.    T(F): 98.24 (10-17 @ 10:45), Max: 98.42 (10-16 @ 15:07)  HR: 76 (10-17 @ 12:04)  BP: 106/57 (10-17 @ 11:51) (89/53 - 159/93)  RR: 18 (10-17 @ 10:45)    EFM: 135bpm/moderate variability/+accels  TOCO: q2mins  SVE: 4/80/-2, ruptured, vtx per Dr. Downey    Medications:  acetaminophen   Tablet ..: 650 (10-16 @ 02:48)  ampicillin  IVPB: 216 (10-15 @ 23:11)  ampicillin  IVPB: 108 (10-17 @ 10:45),  108 (10-17 @ 06:40),  108 (10-17 @ 03:00),  108 (10-16 @ 22:54),  108 (10-16 @ 18:48),  108 (10-16 @ 15:00),  108 (10-16 @ 11:06),  108 (10-16 @ 06:58),  108 (10-16 @ 02:59)  butorphanol Injectable: 2 (10-16 @ 21:45)  dinoprostone Insert: 10 (10-16 @ 15:17)  dinoprostone Insert: 10 (10-15 @ 23:02)  diphenhydrAMINE   Injectable: 25 (10-16 @ 21:46)  insulin NPH human recombinant: 4 (10-15 @ 23:10)  lactated ringers.: 125 (10-15 @ 21:37)  oxytocin Infusion: 2 (10-17 @ 07:26)      Labs:                        12.4   10.68 )-----------( 193      ( 15 Oct 2019 21:38 )             35.8             Urinalysis Basic - ( 15 Oct 2019 21:38 )    Color: Light Yellow / Appearance: Slightly Turbid / S.008 / pH: x  Gluc: x / Ketone: Negative  / Bili: Negative / Urobili: <2 mg/dL   Blood: x / Protein: Negative / Nitrite: Negative   Leuk Esterase: Large / RBC: 2 /HPF /  /HPF   Sq Epi: x / Non Sq Epi: 3 /HPF / Bacteria: Moderate

## 2019-10-17 NOTE — PROGRESS NOTE ADULT - ASSESSMENT
A/P:   29y  at [id number], GBS positive, s/p epidural, on pitocin, in labor progressing well.  -Continue current management   -Pain management prn  -Continuous EFM/toco  -F/u pending labs    Dr. Castro and Dr. Downey aware

## 2019-10-17 NOTE — PROGRESS NOTE ADULT - ASSESSMENT
A/P:  29y  at  40wks IOL for GDM A1, s/p Cervidil x 1, Cervical balloon  -continue pitocin  -pain management prn  -cont efm/toco  -Dr. Downey aware

## 2019-10-17 NOTE — OB PROVIDER IHI INDUCTION/AUGMENTATION NOTE - NS_CHECKALL_OBGYN_ALL_OB
Contractions pattern was reviewed/Order was written/Induction / Augmentation was discussed/H&P was completed/FHR was reviewed
Contractions pattern was reviewed/Order was written/H&P was completed/FHR was reviewed/Induction / Augmentation was discussed
H&P was completed/Order was written/Induction / Augmentation was discussed/Contractions pattern was reviewed/FHR was reviewed

## 2019-10-17 NOTE — CHART NOTE - NSCHARTNOTEFT_GEN_A_CORE
Thursday  10/17/2019  05:34 AM    CErvical Balloon in place  VE:  1/50/-3    Will continue to remain readily available.

## 2019-10-17 NOTE — PROGRESS NOTE ADULT - SUBJECTIVE AND OBJECTIVE BOX
PGY2 L&D Note    Patient seen at bedside for evaluation of labor progression, doing well, no complaints.     T(F): 98.24 (10-17 @ 02:22), Max: 98.42 (10-16 @ 15:07)  HR: 60 (10-17 @ 03:45)  BP: 105/53 (10-17 @ 03:45) (89/53 - 126/59)  RR: 18 (10-17 @ 02:22)   EFM: 125/mod/accel+  TOCO:  SVE: 1/L/-3, vertex and intact as per Dr. Olivares's exam    Medications:  cervidil #1 in at 2300 on 10/15, out at 1100 on 10/16  cervidil #2 in at 1517 on 10/16, out at 0340 on 10/17  ampicillin started at 2310 on 10/15  stadol and benadryl at 2130 on 10/16    Labs:                        12.4   10.68 )-----------( 193      ( 15 Oct 2019 21:38 )             35.8      Urinalysis Basic - ( 15 Oct 2019 21:38 )  Color: Light Yellow / Appearance: Slightly Turbid / S.008 / pH: x  Gluc: x / Ketone: Negative  / Bili: Negative / Urobili: <2 mg/dL   Blood: x / Protein: Negative / Nitrite: Negative   Leuk Esterase: Large / RBC: 2 /HPF /  /HPF   Sq Epi: x / Non Sq Epi: 3 /HPF / Bacteria: Moderate        A/P:   29y @ s/p epidural, s/p pitocin, in labor  -continue pitocin  -pain management prn  -cont efm/toco  -f/u pending labs  -cont to monitor vitals  -cont iv hydration PGY2 L&D Note    Patient seen at bedside for evaluation of labor progression, doing well, no complaints.     T(F): 98.24 (10-17 @ 02:22), Max: 98.42 (10-16 @ 15:07)  HR: 60 (10-17 @ 03:45)  BP: 105/53 (10-17 @ 03:45) (89/53 - 126/59)  RR: 18 (10-17 @ 02:22)   EFM: 125/mod/accel+  TOCO: irregular  SVE: 1/L/-3, vertex and intact as per Dr. Olivares's exam    Medications:  cervidil #1 in at 2300 on 10/15, out at 1100 on 10/16  cervidil #2 in at 1517 on 10/16, out at 0340 on 10/17  ampicillin started at 2310 on 10/15  stadol and benadryl at 2130 on 10/16    Labs:                        12.4   10.68 )-----------( 193      ( 15 Oct 2019 21:38 )             35.8      Urinalysis Basic - ( 15 Oct 2019 21:38 )  Color: Light Yellow / Appearance: Slightly Turbid / S.008 / pH: x  Gluc: x / Ketone: Negative  / Bili: Negative / Urobili: <2 mg/dL   Blood: x / Protein: Negative / Nitrite: Negative   Leuk Esterase: Large / RBC: 2 /HPF /  /HPF   Sq Epi: x / Non Sq Epi: 3 /HPF / Bacteria: Moderate    A/P:   28yo  @40w, GBS pos, IOL for GDMA2 on insulin, 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 Downey and Dr. Jorge aware.

## 2019-10-17 NOTE — PROCEDURE NOTE - SUPERVISORY STATEMENT
Epidural infusion:  0.1% Bupivacaine PLAIN  Rate 15 ml/hr  Sensory:  T8 (Rt = Mid = Lt);  Motor intact, able to flex b/l knees w/o any difficulty  NO Narcotics administered in LD or HIPOLITO.

## 2019-10-18 ENCOUNTER — TRANSCRIPTION ENCOUNTER (OUTPATIENT)
Age: 29
End: 2019-10-18

## 2019-10-18 LAB
BASOPHILS # BLD AUTO: 0.02 K/UL — SIGNIFICANT CHANGE UP (ref 0–0.2)
BASOPHILS NFR BLD AUTO: 0.1 % — SIGNIFICANT CHANGE UP (ref 0–1)
EOSINOPHIL # BLD AUTO: 0.04 K/UL — SIGNIFICANT CHANGE UP (ref 0–0.7)
EOSINOPHIL NFR BLD AUTO: 0.2 % — SIGNIFICANT CHANGE UP (ref 0–8)
HCT VFR BLD CALC: 27.7 % — LOW (ref 37–47)
HGB BLD-MCNC: 9.5 G/DL — LOW (ref 12–16)
IMM GRANULOCYTES NFR BLD AUTO: 0.6 % — HIGH (ref 0.1–0.3)
LYMPHOCYTES # BLD AUTO: 1.94 K/UL — SIGNIFICANT CHANGE UP (ref 1.2–3.4)
LYMPHOCYTES # BLD AUTO: 11.4 % — LOW (ref 20.5–51.1)
MCHC RBC-ENTMCNC: 31.8 PG — HIGH (ref 27–31)
MCHC RBC-ENTMCNC: 34.3 G/DL — SIGNIFICANT CHANGE UP (ref 32–37)
MCV RBC AUTO: 92.6 FL — SIGNIFICANT CHANGE UP (ref 81–99)
MONOCYTES # BLD AUTO: 1.53 K/UL — HIGH (ref 0.1–0.6)
MONOCYTES NFR BLD AUTO: 9 % — SIGNIFICANT CHANGE UP (ref 1.7–9.3)
NEUTROPHILS # BLD AUTO: 13.38 K/UL — HIGH (ref 1.4–6.5)
NEUTROPHILS NFR BLD AUTO: 78.7 % — HIGH (ref 42.2–75.2)
NRBC # BLD: 0 /100 WBCS — SIGNIFICANT CHANGE UP (ref 0–0)
PLATELET # BLD AUTO: 161 K/UL — SIGNIFICANT CHANGE UP (ref 130–400)
RBC # BLD: 2.99 M/UL — LOW (ref 4.2–5.4)
RBC # FLD: 13.3 % — SIGNIFICANT CHANGE UP (ref 11.5–14.5)
WBC # BLD: 17.01 K/UL — HIGH (ref 4.8–10.8)
WBC # FLD AUTO: 17.01 K/UL — HIGH (ref 4.8–10.8)

## 2019-10-18 RX ORDER — IBUPROFEN 200 MG
600 TABLET ORAL EVERY 6 HOURS
Refills: 0 | Status: DISCONTINUED | OUTPATIENT
Start: 2019-10-18 | End: 2019-10-19

## 2019-10-18 RX ORDER — IBUPROFEN 200 MG
1 TABLET ORAL
Qty: 0 | Refills: 0 | DISCHARGE
Start: 2019-10-18

## 2019-10-18 RX ADMIN — Medication 600 MILLIGRAM(S): at 06:22

## 2019-10-18 RX ADMIN — Medication 975 MILLIGRAM(S): at 08:26

## 2019-10-18 RX ADMIN — Medication 975 MILLIGRAM(S): at 21:10

## 2019-10-18 RX ADMIN — Medication 600 MILLIGRAM(S): at 12:06

## 2019-10-18 RX ADMIN — Medication 600 MILLIGRAM(S): at 17:30

## 2019-10-18 NOTE — DISCHARGE NOTE OB - HOSPITAL COURSE
DATE OF DISCHARGE: 10-18-19 @ 22:06    HISTORY OF PRESENT ILLNESS/HOSPITAL COURSE: HPI:  28yo  @39w5d, REYNA 10/17/19 by LMP c/w first trimester sonogram presents to L&D for IOL for GDMA2 on insulin. She reports fasting FS are 70-80, 2hr PP range 110-140, she takes NPH 8 units in the morning, and 8 units in bedtime. Her last dose was this morning. She denies contractions, VB, LOF. Reports good FM. She has h/o HSV-1, last outbreak was during the beginning of this pregnancy, she took Valtrex for a few weeks. She denies current burning sensation or outbreaks. She resumed Valtrex at 36wks, she takes it BID, she already took both doses today. She denies any other issues during this gestation. (15 Oct 2019 21:18)    PAST MEDICAL & SURGICAL HISTORY:  No pertinent past medical history  H/O dilation and curettage  S/P laparoscopic cholecystectomy      PROCEDURES PERFORMED: vaginal delivery    COMPLICATIONS:  none    POST PARTUM COURSE: uncomplicated       FINAL DIAGNOSIS:  normal vaginal delivery    LABS:                       9.5    17.01 )-----------( 161      ( 18 Oct 2019 05:47 )             27.7

## 2019-10-18 NOTE — PROGRESS NOTE ADULT - SUBJECTIVE AND OBJECTIVE BOX
Subjective:   Patient doing well. No complaints. Minimal lochia. Pain controlled.    Objective:   T(F): 97.4 (10-18 @ 08:19), Max: 98.78 (10-17 @ 17:30)  HR: 88 (10-18 @ 08:19)  BP: 104/54 (10-18 @ 08:19) (93/55 - 173/83)  RR: 18 (10-18 @ 08:19)  SpO2: 97% (10-17 @ 22:21) (77% - 100%)  Gen: AAOx3, NAD  Abd: Soft, Nontender, Nondistended, Fundus firm below the umbilicus  Ext: no tender, mild edema  Min Lochia Rubra    Labs:                        9.5    17.01 )-----------( 161      ( 18 Oct 2019 05:47 )             27.7             Tolerating regular diet  Passed flatus, passed bowel movement  Breast/Bottle feeding    Assessment:   29y s/p , PPD#1, doing well    Plan:  -Routine postpartum care  -Encouraged ambulation and PO hydration  -Tolerating regular diet

## 2019-10-18 NOTE — DISCHARGE NOTE OB - PLAN OF CARE
healthy recovery Nothing per vagina x 6 weeks; no sex/tampons/swimming/baths. Showers are OKAY. normal blood sugar no more fingersticks. We will do a 2 hour test after your postpartum visit.

## 2019-10-18 NOTE — DISCHARGE NOTE OB - PATIENT PORTAL LINK FT
You can access the FollowMyHealth Patient Portal offered by  by registering at the following website: [email or web address]. By joining [facility]’s FollowMyHealth portal, you will also be able to view your health information using other applications (apps) compatible with our system.

## 2019-10-18 NOTE — DISCHARGE NOTE OB - CARE PROVIDER_API CALL
Luis Downey)  Obstetrics and Gynecology  [address] [place] [address]  Phone: [phone]  Fax: [phone]  Follow Up Time:

## 2019-10-18 NOTE — DISCHARGE NOTE OB - CARE PLAN
Principal Discharge DX:	Vaginal delivery  Goal:	healthy recovery  Assessment and plan of treatment:	Nothing per vagina x 6 weeks; no sex/tampons/swimming/baths. Showers are OKAY.  Secondary Diagnosis:	Insulin controlled gestational diabetes mellitus (GDM) during pregnancy, antepartum  Goal:	normal blood sugar  Assessment and plan of treatment:	no more fingersticks. We will do a 2 hour test after your postpartum visit.

## 2019-10-19 VITALS
TEMPERATURE: 97 F | HEART RATE: 69 BPM | RESPIRATION RATE: 19 BRPM | DIASTOLIC BLOOD PRESSURE: 55 MMHG | SYSTOLIC BLOOD PRESSURE: 105 MMHG

## 2019-10-19 RX ADMIN — Medication 600 MILLIGRAM(S): at 06:36

## 2019-10-19 RX ADMIN — Medication 600 MILLIGRAM(S): at 12:29

## 2019-10-19 RX ADMIN — Medication 600 MILLIGRAM(S): at 00:51

## 2019-10-19 RX ADMIN — INFLUENZA VIRUS VACCINE 0.5 MILLILITER(S): 15; 15; 15; 15 SUSPENSION INTRAMUSCULAR at 07:20

## 2019-10-19 NOTE — PROGRESS NOTE ADULT - SUBJECTIVE AND OBJECTIVE BOX
OB attending  PPD #2    Pt doing well, pain well controlled. No overnight events, no acute complaints.    Ambulating: Yes  Voiding: Yes  Flatus: Yes  Bowel movements: Yes   Breast or bottle feeding: Breastfeeding  Diet: Regular    PAST MEDICAL & SURGICAL HISTORY:  No pertinent past medical history  H/O dilation and curettage  S/P laparoscopic cholecystectomy      Physical Exam  Vital Signs Last 24 Hrs  T(C): 36.2 (18 Oct 2019 23:53), Max: 36.3 (18 Oct 2019 08:19)  T(F): 97.2 (18 Oct 2019 23:53), Max: 97.4 (18 Oct 2019 08:19)  HR: 92 (18 Oct 2019 23:53) (81 - 92)  BP: 114/58 (18 Oct 2019 23:53) (104/54 - 114/58)  BP(mean): --  RR: 18 (18 Oct 2019 23:53) (18 - 18)  SpO2: --  Gen: AAOx3, NAD  Abd: Soft, nontender, nondistended, BS+  Fundus: Firm, below umbilicus  Lochia: normal  Ext: No calf tenderness, no swelling    Labs:                        9.5    17.01 )-----------( 161      ( 18 Oct 2019 05:47 )             27.7         A/P: s/p , PPD #2, doing well  - d/c home

## 2019-10-22 DIAGNOSIS — Z3A.39 39 WEEKS GESTATION OF PREGNANCY: ICD-10-CM

## 2019-10-22 DIAGNOSIS — Z90.49 ACQUIRED ABSENCE OF OTHER SPECIFIED PARTS OF DIGESTIVE TRACT: ICD-10-CM

## 2019-10-22 DIAGNOSIS — Z23 ENCOUNTER FOR IMMUNIZATION: ICD-10-CM

## 2020-11-18 ENCOUNTER — FORM ENCOUNTER (OUTPATIENT)
Age: 30
End: 2020-11-18

## 2020-11-19 ENCOUNTER — FORM ENCOUNTER (OUTPATIENT)
Age: 30
End: 2020-11-19

## 2020-12-22 ENCOUNTER — FORM ENCOUNTER (OUTPATIENT)
Age: 30
End: 2020-12-22

## 2021-06-25 NOTE — OB PROVIDER IHI INDUCTION/AUGMENTATION NOTE - NS_LMP_OBGYN_ALL_OB_DT
[facility]PITAL[facility]  DISCHARGE SUMMARY     Beryle Nipple Patient Status:  Inpatient    10/3/1936 MRN [id number]   [facility] 3SW-A Attending No att. providers found   Hosp Day # 1 PCP Yamilex Uriarte DO     Date of Admission: 
Quantity: 90 tablet  Refills: 4     Calcium Carbonate-Vitamin D 600-400 MG-UNIT Tabs  Commonly known as: Calcium 600-D      Take 1 tablet by mouth 2 (two) times daily.  take 1 tablet by  mouth twice a day   Quantity: 180 tablet  Refills: 0     Iron 325 (65
10-Zane-2019
10-Zane-2019
neurological deficits. Musculoskeletal: Moves all extremities. Extremities: No edema.   -----------------------------------------------------------------------------------------------  PATIENT DISCHARGE INSTRUCTIONS: See electronic chart    Emma Lo,
10-Zane-2019

## 2022-02-08 ENCOUNTER — FORM ENCOUNTER (OUTPATIENT)
Age: 32
End: 2022-02-08

## 2022-02-09 VITALS
WEIGHT: 171 LBS | DIASTOLIC BLOOD PRESSURE: 70 MMHG | HEIGHT: 64 IN | BODY MASS INDEX: 29.19 KG/M2 | SYSTOLIC BLOOD PRESSURE: 108 MMHG

## 2022-04-13 NOTE — OB PROVIDER H&P - LIVING CHILDREN, OB PROFILE
[facility] phone call message-patient reporting a symptom:     Symptom: Pt would like to speak with a nurse regarding a very bad nose bleed.    Same Day Visit Offered: no     Additional comments: none    OK to leave message on voice mail? Yes    Primary language: English      needed? No    Call taken on April 13, 2022 at 9:54 AM by Miguel Gunter       0

## 2022-09-14 ENCOUNTER — NON-APPOINTMENT (OUTPATIENT)
Age: 32
End: 2022-09-14

## 2022-09-14 DIAGNOSIS — Z98.890 OTHER SPECIFIED POSTPROCEDURAL STATES: ICD-10-CM

## 2022-09-14 DIAGNOSIS — Z78.9 OTHER SPECIFIED HEALTH STATUS: ICD-10-CM

## 2022-09-14 DIAGNOSIS — R87.610 ATYPICAL SQUAMOUS CELLS OF UNDETERMINED SIGNIFICANCE ON CYTOLOGIC SMEAR OF CERVIX (ASC-US): ICD-10-CM

## 2022-09-14 DIAGNOSIS — Z86.19 PERSONAL HISTORY OF OTHER INFECTIOUS AND PARASITIC DISEASES: ICD-10-CM

## 2022-09-14 DIAGNOSIS — Z87.42 PERSONAL HISTORY OF OTHER DISEASES OF THE FEMALE GENITAL TRACT: ICD-10-CM

## 2022-09-14 DIAGNOSIS — R87.612 LOW GRADE SQUAMOUS INTRAEPITHELIAL LESION ON CYTOLOGIC SMEAR OF CERVIX (LGSIL): ICD-10-CM

## 2022-09-15 ENCOUNTER — APPOINTMENT (OUTPATIENT)
Dept: OBGYN | Facility: CLINIC | Age: 32
End: 2022-09-15
Payer: COMMERCIAL

## 2022-09-15 VITALS
DIASTOLIC BLOOD PRESSURE: 74 MMHG | BODY MASS INDEX: 31.07 KG/M2 | WEIGHT: 182 LBS | SYSTOLIC BLOOD PRESSURE: 123 MMHG | HEIGHT: 64 IN

## 2022-09-15 DIAGNOSIS — N91.1 SECONDARY AMENORRHEA: ICD-10-CM

## 2022-09-15 PROCEDURE — 99213 OFFICE O/P EST LOW 20 MIN: CPT | Mod: 25

## 2022-09-15 PROCEDURE — 81003 URINALYSIS AUTO W/O SCOPE: CPT | Mod: QW

## 2022-09-15 PROCEDURE — 76817 TRANSVAGINAL US OBSTETRIC: CPT

## 2022-09-15 PROCEDURE — 36415 COLL VENOUS BLD VENIPUNCTURE: CPT

## 2022-09-15 PROCEDURE — 81025 URINE PREGNANCY TEST: CPT

## 2022-09-16 LAB
BILIRUB UR QL STRIP: NORMAL
GLUCOSE UR-MCNC: NORMAL
HCG UR QL: 0.2 EU/DL
HCG UR QL: POSITIVE
HGB UR QL STRIP.AUTO: NORMAL
KETONES UR-MCNC: NORMAL
LEUKOCYTE ESTERASE UR QL STRIP: NORMAL
NITRITE UR QL STRIP: NORMAL
PH UR STRIP: 7
PROT UR STRIP-MCNC: NORMAL
QUALITY CONTROL: YES
SP GR UR STRIP: 1.02

## 2022-09-18 NOTE — COUNSELING
[Nutrition/ Exercise/ Weight Management] : nutrition, exercise, weight management [Vitamins/Supplements] : vitamins/supplements [Vaccines] : vaccines [Other ___] : [unfilled]

## 2022-09-18 NOTE — PROCEDURE
[Transvaginal OB Sonogram] : Transvaginal OB Sonogram [Intrauterine Pregnancy] : intrauterine pregnancy [Yolk Sac] : yolk sac present [Fetal Heart] : fetal heart present [CRL: ___ (mm)] : CRL - [unfilled]Umm [Date: ___] : Date: [unfilled] [Current GA by Sonogram: ___ (wks)] : Current GA by Sonogram: [unfilled]Uwks [___ day(s)] : [unfilled] days [Transvaginal OB Sonogram WNL] : Transvaginal OB Sonogram WNL [FreeTextEntry1] : Viable IUP crl 4.3mm-6.1 wks, FHR 108bpm, f/u 2-3 wks

## 2022-09-18 NOTE — PHYSICAL EXAM
[Chaperone Present] : A chaperone was present in the examining room during all aspects of the physical examination [Labia Majora] : normal [Labia Minora] : normal [Normal] : normal [Uterine Adnexae] : normal [FreeTextEntry1] : La[place]

## 2022-09-18 NOTE — DISCUSSION/SUMMARY
[FreeTextEntry1] : Pregnancy confirmed, size c/w dates, mild hyperemesis gravidarum \par -Pn sent from office \par -Precautions /counseling \par -f/u 2 wks\par -will want aneuploidy testing \par -rpt pap 2025\par

## 2022-09-18 NOTE — HISTORY OF PRESENT ILLNESS
[Patient reported PAP Smear was normal] : Patient reported PAP Smear was normal [PapSmeardate] : 02/22 [FreeTextEntry1] : 08/01/22

## 2022-09-19 LAB
ABO + RH PNL BLD: NORMAL
BACTERIA UR CULT: NORMAL
BASOPHILS # BLD AUTO: 0.02 K/UL
BASOPHILS NFR BLD AUTO: 0.2 %
BLD GP AB SCN SERPL QL: NORMAL
EOSINOPHIL # BLD AUTO: 0.08 K/UL
EOSINOPHIL NFR BLD AUTO: 1 %
HBV SURFACE AG SER QL: NONREACTIVE
HCT VFR BLD CALC: 43.6 %
HCV AB SER QL: NONREACTIVE
HCV S/CO RATIO: 0.13 S/CO
HGB BLD-MCNC: 13.1 G/DL
HIV1+2 AB SPEC QL IA.RAPID: NONREACTIVE
IMM GRANULOCYTES NFR BLD AUTO: 0.2 %
LEAD BLD-MCNC: <1 UG/DL
LYMPHOCYTES # BLD AUTO: 2.49 K/UL
LYMPHOCYTES NFR BLD AUTO: 29.9 %
MAN DIFF?: NORMAL
MCHC RBC-ENTMCNC: 30 GM/DL
MCHC RBC-ENTMCNC: 30.6 PG
MCV RBC AUTO: 101.9 FL
MEV IGG FLD QL IA: >300 AU/ML
MEV IGG+IGM SER-IMP: POSITIVE
MONOCYTES # BLD AUTO: 0.53 K/UL
MONOCYTES NFR BLD AUTO: 6.4 %
MUV AB SER-ACNC: POSITIVE
MUV IGG SER QL IA: >300 AU/ML
NEUTROPHILS # BLD AUTO: 5.2 K/UL
NEUTROPHILS NFR BLD AUTO: 62.3 %
PLATELET # BLD AUTO: 262 K/UL
RBC # BLD: 4.28 M/UL
RBC # FLD: 13.5 %
RUBV IGG FLD-ACNC: 2.4 INDEX
RUBV IGG SER-IMP: POSITIVE
T PALLIDUM AB SER QL IA: NEGATIVE
VZV AB TITR SER: POSITIVE
VZV IGG SER IF-ACNC: 288.6 INDEX
WBC # FLD AUTO: 8.34 K/UL

## 2022-09-22 ENCOUNTER — APPOINTMENT (OUTPATIENT)
Dept: OBGYN | Facility: CLINIC | Age: 32
End: 2022-09-22

## 2022-09-22 LAB
B19V IGG SER QL IA: 6.89 INDEX
B19V IGG+IGM SER-IMP: NORMAL
B19V IGG+IGM SER-IMP: POSITIVE
B19V IGM FLD-ACNC: 0.17 INDEX
B19V IGM SER-ACNC: NEGATIVE

## 2022-09-28 RX ORDER — PRENATAL VIT/IRON FUM/FOLIC AC 28MG-0.8MG
28-0.8 TABLET ORAL DAILY
Qty: 30 | Refills: 11 | Status: ACTIVE | COMMUNITY
Start: 2022-09-28 | End: 1900-01-01

## 2022-10-12 ENCOUNTER — APPOINTMENT (OUTPATIENT)
Dept: OBGYN | Facility: CLINIC | Age: 32
End: 2022-10-12

## 2022-10-12 VITALS
DIASTOLIC BLOOD PRESSURE: 70 MMHG | BODY MASS INDEX: 33.02 KG/M2 | SYSTOLIC BLOOD PRESSURE: 106 MMHG | HEIGHT: 63 IN | WEIGHT: 186.38 LBS

## 2022-10-12 LAB
BILIRUB UR QL STRIP: NORMAL
GLUCOSE UR-MCNC: NORMAL
HCG UR QL: 0.2 EU/DL
HGB UR QL STRIP.AUTO: NORMAL
KETONES UR-MCNC: NORMAL
LEUKOCYTE ESTERASE UR QL STRIP: NORMAL
NITRITE UR QL STRIP: NORMAL
PH UR STRIP: 6
PROT UR STRIP-MCNC: NORMAL
SP GR UR STRIP: 1

## 2022-10-12 PROCEDURE — 36415 COLL VENOUS BLD VENIPUNCTURE: CPT

## 2022-10-12 PROCEDURE — 0501F PRENATAL FLOW SHEET: CPT

## 2022-10-12 PROCEDURE — 81003 URINALYSIS AUTO W/O SCOPE: CPT | Mod: QW

## 2022-10-13 LAB
ESTIMATED AVERAGE GLUCOSE: 97 MG/DL
HBA1C MFR BLD HPLC: 5 %

## 2022-10-27 ENCOUNTER — NON-APPOINTMENT (OUTPATIENT)
Age: 32
End: 2022-10-27

## 2022-11-07 ENCOUNTER — ASOB RESULT (OUTPATIENT)
Age: 32
End: 2022-11-07

## 2022-11-07 ENCOUNTER — APPOINTMENT (OUTPATIENT)
Dept: ANTEPARTUM | Facility: CLINIC | Age: 32
End: 2022-11-07
Payer: COMMERCIAL

## 2022-11-07 PROCEDURE — 76801 OB US < 14 WKS SINGLE FETUS: CPT

## 2022-11-07 PROCEDURE — 76813 OB US NUCHAL MEAS 1 GEST: CPT

## 2022-11-09 ENCOUNTER — LABORATORY RESULT (OUTPATIENT)
Age: 32
End: 2022-11-09

## 2022-11-09 ENCOUNTER — APPOINTMENT (OUTPATIENT)
Dept: OBGYN | Facility: CLINIC | Age: 32
End: 2022-11-09

## 2022-11-09 VITALS — BODY MASS INDEX: 34.9 KG/M2 | DIASTOLIC BLOOD PRESSURE: 57 MMHG | SYSTOLIC BLOOD PRESSURE: 116 MMHG | WEIGHT: 197 LBS

## 2022-11-09 LAB
BILIRUB UR QL STRIP: NORMAL
GLUCOSE UR-MCNC: NORMAL
HCG UR QL: 0.2 EU/DL
HGB UR QL STRIP.AUTO: NORMAL
KETONES UR-MCNC: NORMAL
LEUKOCYTE ESTERASE UR QL STRIP: NORMAL
NITRITE UR QL STRIP: NORMAL
PH UR STRIP: 6
PROT UR STRIP-MCNC: NORMAL
SP GR UR STRIP: 1.01

## 2022-11-09 PROCEDURE — 0502F SUBSEQUENT PRENATAL CARE: CPT | Mod: NC

## 2022-11-09 PROCEDURE — 81003 URINALYSIS AUTO W/O SCOPE: CPT | Mod: QW

## 2022-11-10 ENCOUNTER — APPOINTMENT (OUTPATIENT)
Dept: OBGYN | Facility: CLINIC | Age: 32
End: 2022-11-10

## 2022-11-10 PROCEDURE — 36415 COLL VENOUS BLD VENIPUNCTURE: CPT

## 2022-11-11 LAB
BASOPHILS # BLD AUTO: 0.02 K/UL
BASOPHILS NFR BLD AUTO: 0.3 %
EOSINOPHIL # BLD AUTO: 0.1 K/UL
EOSINOPHIL NFR BLD AUTO: 1.4 %
GLUCOSE 1H P 50 G GLC PO SERPL-MCNC: 103 MG/DL
HCT VFR BLD CALC: 36.6 %
HGB BLD-MCNC: 11.9 G/DL
IMM GRANULOCYTES NFR BLD AUTO: 0.1 %
LYMPHOCYTES # BLD AUTO: 1.89 K/UL
LYMPHOCYTES NFR BLD AUTO: 26.4 %
MAN DIFF?: NORMAL
MCHC RBC-ENTMCNC: 30.4 PG
MCHC RBC-ENTMCNC: 32.5 GM/DL
MCV RBC AUTO: 93.4 FL
MONOCYTES # BLD AUTO: 0.43 K/UL
MONOCYTES NFR BLD AUTO: 6 %
NEUTROPHILS # BLD AUTO: 4.71 K/UL
NEUTROPHILS NFR BLD AUTO: 65.8 %
PLATELET # BLD AUTO: 225 K/UL
RBC # BLD: 3.92 M/UL
RBC # FLD: 13.7 %
WBC # FLD AUTO: 7.16 K/UL

## 2022-11-28 ENCOUNTER — RX RENEWAL (OUTPATIENT)
Age: 32
End: 2022-11-28

## 2022-11-28 RX ORDER — DOXYLAMINE SUCCINATE AND PYRIDOXINE HYDROCHLORIDE 10; 10 MG/1; MG/1
10-10 TABLET, DELAYED RELEASE ORAL
Qty: 60 | Refills: 1 | Status: ACTIVE | COMMUNITY
Start: 2022-11-28 | End: 1900-01-01

## 2022-11-28 RX ORDER — DOXYLAMINE SUCCINATE AND PYRIDOXINE HYDROCHLORIDE 10; 10 MG/1; MG/1
10-10 TABLET, DELAYED RELEASE ORAL
Qty: 60 | Refills: 0 | Status: ACTIVE | COMMUNITY
Start: 2022-09-15 | End: 1900-01-01

## 2022-12-02 ENCOUNTER — APPOINTMENT (OUTPATIENT)
Dept: OBGYN | Facility: CLINIC | Age: 32
End: 2022-12-02

## 2022-12-02 ENCOUNTER — NON-APPOINTMENT (OUTPATIENT)
Age: 32
End: 2022-12-02

## 2022-12-02 VITALS — WEIGHT: 205 LBS | BODY MASS INDEX: 36.31 KG/M2 | SYSTOLIC BLOOD PRESSURE: 98 MMHG | DIASTOLIC BLOOD PRESSURE: 61 MMHG

## 2022-12-02 LAB
BILIRUB UR QL STRIP: NORMAL
GLUCOSE UR-MCNC: NORMAL
HCG UR QL: 0.2 EU/DL
HGB UR QL STRIP.AUTO: NORMAL
KETONES UR-MCNC: NORMAL
LEUKOCYTE ESTERASE UR QL STRIP: NORMAL
NITRITE UR QL STRIP: NORMAL
PH UR STRIP: 6
PROT UR STRIP-MCNC: NORMAL
SP GR UR STRIP: 1.02

## 2022-12-02 PROCEDURE — 81003 URINALYSIS AUTO W/O SCOPE: CPT | Mod: QW

## 2022-12-02 PROCEDURE — 0502F SUBSEQUENT PRENATAL CARE: CPT | Mod: NC

## 2022-12-02 RX ORDER — AZITHROMYCIN 250 MG/1
250 TABLET, FILM COATED ORAL
Qty: 6 | Refills: 0 | Status: DISCONTINUED | COMMUNITY
Start: 2022-06-28

## 2022-12-04 LAB — BACTERIA UR CULT: NORMAL

## 2022-12-05 ENCOUNTER — APPOINTMENT (OUTPATIENT)
Dept: OBGYN | Facility: CLINIC | Age: 32
End: 2022-12-05

## 2022-12-09 ENCOUNTER — TRANSCRIPTION ENCOUNTER (OUTPATIENT)
Age: 32
End: 2022-12-09

## 2022-12-20 ENCOUNTER — NON-APPOINTMENT (OUTPATIENT)
Age: 32
End: 2022-12-20

## 2022-12-21 ENCOUNTER — APPOINTMENT (OUTPATIENT)
Dept: ANTEPARTUM | Facility: CLINIC | Age: 32
End: 2022-12-21

## 2022-12-21 ENCOUNTER — APPOINTMENT (OUTPATIENT)
Dept: OBGYN | Facility: CLINIC | Age: 32
End: 2022-12-21

## 2022-12-27 ENCOUNTER — APPOINTMENT (OUTPATIENT)
Dept: ANTEPARTUM | Facility: CLINIC | Age: 32
End: 2022-12-27

## 2022-12-28 ENCOUNTER — ASOB RESULT (OUTPATIENT)
Age: 32
End: 2022-12-28

## 2022-12-28 ENCOUNTER — APPOINTMENT (OUTPATIENT)
Dept: ANTEPARTUM | Facility: CLINIC | Age: 32
End: 2022-12-28

## 2022-12-28 ENCOUNTER — OUTPATIENT (OUTPATIENT)
Dept: OUTPATIENT SERVICES | Facility: HOSPITAL | Age: 32
LOS: 1 days | Discharge: HOME | End: 2022-12-28

## 2022-12-28 DIAGNOSIS — Z90.49 ACQUIRED ABSENCE OF OTHER SPECIFIED PARTS OF DIGESTIVE TRACT: Chronic | ICD-10-CM

## 2022-12-28 DIAGNOSIS — Z98.890 OTHER SPECIFIED POSTPROCEDURAL STATES: Chronic | ICD-10-CM

## 2022-12-28 PROCEDURE — 76811 OB US DETAILED SNGL FETUS: CPT | Mod: 26

## 2022-12-30 DIAGNOSIS — O35.9XX0 MATERNAL CARE FOR (SUSPECTED) FETAL ABNORMALITY AND DAMAGE, UNSPECIFIED, NOT APPLICABLE OR UNSPECIFIED: ICD-10-CM

## 2022-12-30 DIAGNOSIS — O99.212 OBESITY COMPLICATING PREGNANCY, SECOND TRIMESTER: ICD-10-CM

## 2022-12-30 DIAGNOSIS — Z3A.21 21 WEEKS GESTATION OF PREGNANCY: ICD-10-CM

## 2023-01-05 ENCOUNTER — APPOINTMENT (OUTPATIENT)
Dept: OBGYN | Facility: CLINIC | Age: 33
End: 2023-01-05
Payer: COMMERCIAL

## 2023-01-05 ENCOUNTER — NON-APPOINTMENT (OUTPATIENT)
Age: 33
End: 2023-01-05

## 2023-01-05 VITALS — SYSTOLIC BLOOD PRESSURE: 103 MMHG | BODY MASS INDEX: 37.2 KG/M2 | DIASTOLIC BLOOD PRESSURE: 69 MMHG | WEIGHT: 210 LBS

## 2023-01-05 DIAGNOSIS — Z00.00 ENCOUNTER FOR GENERAL ADULT MEDICAL EXAMINATION W/OUT ABNORMAL FINDINGS: ICD-10-CM

## 2023-01-05 PROCEDURE — 81003 URINALYSIS AUTO W/O SCOPE: CPT | Mod: QW

## 2023-01-05 PROCEDURE — 0502F SUBSEQUENT PRENATAL CARE: CPT | Mod: NC

## 2023-01-10 ENCOUNTER — APPOINTMENT (OUTPATIENT)
Dept: OBGYN | Facility: CLINIC | Age: 33
End: 2023-01-10

## 2023-02-02 ENCOUNTER — APPOINTMENT (OUTPATIENT)
Dept: OBGYN | Facility: CLINIC | Age: 33
End: 2023-02-02
Payer: COMMERCIAL

## 2023-02-02 VITALS — SYSTOLIC BLOOD PRESSURE: 110 MMHG | BODY MASS INDEX: 37.91 KG/M2 | WEIGHT: 214 LBS | DIASTOLIC BLOOD PRESSURE: 75 MMHG

## 2023-02-02 LAB
BILIRUB UR QL STRIP: NORMAL
GLUCOSE UR-MCNC: 500
HCG UR QL: 0.2 EU/DL
HGB UR QL STRIP.AUTO: NORMAL
KETONES UR-MCNC: NORMAL
LEUKOCYTE ESTERASE UR QL STRIP: NORMAL
NITRITE UR QL STRIP: NORMAL
PH UR STRIP: 5.5
PROT UR STRIP-MCNC: NORMAL
SP GR UR STRIP: 1.01

## 2023-02-02 PROCEDURE — 0502F SUBSEQUENT PRENATAL CARE: CPT | Mod: NC

## 2023-02-02 PROCEDURE — 76816 OB US FOLLOW-UP PER FETUS: CPT

## 2023-02-02 PROCEDURE — 81003 URINALYSIS AUTO W/O SCOPE: CPT | Mod: QW

## 2023-02-03 ENCOUNTER — APPOINTMENT (OUTPATIENT)
Dept: OBGYN | Facility: CLINIC | Age: 33
End: 2023-02-03
Payer: COMMERCIAL

## 2023-02-03 PROCEDURE — 36415 COLL VENOUS BLD VENIPUNCTURE: CPT

## 2023-02-04 LAB
BASOPHILS # BLD AUTO: 0.04 K/UL
BASOPHILS NFR BLD AUTO: 0.4 %
EOSINOPHIL # BLD AUTO: 0.12 K/UL
EOSINOPHIL NFR BLD AUTO: 1.3 %
HCT VFR BLD CALC: 34.5 %
HGB BLD-MCNC: 11.4 G/DL
IMM GRANULOCYTES NFR BLD AUTO: 1 %
LYMPHOCYTES # BLD AUTO: 1.77 K/UL
LYMPHOCYTES NFR BLD AUTO: 19.6 %
MAN DIFF?: NORMAL
MCHC RBC-ENTMCNC: 31.4 PG
MCHC RBC-ENTMCNC: 33 GM/DL
MCV RBC AUTO: 95 FL
MONOCYTES # BLD AUTO: 0.58 K/UL
MONOCYTES NFR BLD AUTO: 6.4 %
NEUTROPHILS # BLD AUTO: 6.41 K/UL
NEUTROPHILS NFR BLD AUTO: 71.3 %
PLATELET # BLD AUTO: 177 K/UL
RBC # BLD: 3.63 M/UL
RBC # FLD: 13.6 %
WBC # FLD AUTO: 9.01 K/UL

## 2023-02-06 LAB — GLUCOSE 1H P 50 G GLC PO SERPL-MCNC: 142 MG/DL

## 2023-02-16 ENCOUNTER — NON-APPOINTMENT (OUTPATIENT)
Age: 33
End: 2023-02-16

## 2023-02-23 ENCOUNTER — APPOINTMENT (OUTPATIENT)
Dept: OBGYN | Facility: CLINIC | Age: 33
End: 2023-02-23
Payer: COMMERCIAL

## 2023-02-23 VITALS — SYSTOLIC BLOOD PRESSURE: 101 MMHG | WEIGHT: 216 LBS | DIASTOLIC BLOOD PRESSURE: 66 MMHG | BODY MASS INDEX: 38.26 KG/M2

## 2023-02-23 LAB
BILIRUB UR QL STRIP: NORMAL
GLUCOSE UR-MCNC: NORMAL
HCG UR QL: 0.2 EU/DL
HGB UR QL STRIP.AUTO: NORMAL
KETONES UR-MCNC: NORMAL
LEUKOCYTE ESTERASE UR QL STRIP: NORMAL
NITRITE UR QL STRIP: NORMAL
PH UR STRIP: 6.5
PROT UR STRIP-MCNC: 30
SP GR UR STRIP: 1.02

## 2023-02-23 PROCEDURE — 0502F SUBSEQUENT PRENATAL CARE: CPT | Mod: NC

## 2023-02-23 PROCEDURE — 81003 URINALYSIS AUTO W/O SCOPE: CPT | Mod: QW

## 2023-03-22 ENCOUNTER — ASOB RESULT (OUTPATIENT)
Age: 33
End: 2023-03-22

## 2023-03-22 ENCOUNTER — APPOINTMENT (OUTPATIENT)
Dept: ANTEPARTUM | Facility: CLINIC | Age: 33
End: 2023-03-22

## 2023-03-22 ENCOUNTER — OUTPATIENT (OUTPATIENT)
Dept: OUTPATIENT SERVICES | Facility: HOSPITAL | Age: 33
LOS: 1 days | End: 2023-03-22
Payer: COMMERCIAL

## 2023-03-22 ENCOUNTER — APPOINTMENT (OUTPATIENT)
Dept: ANTEPARTUM | Facility: CLINIC | Age: 33
End: 2023-03-22
Payer: COMMERCIAL

## 2023-03-22 ENCOUNTER — NON-APPOINTMENT (OUTPATIENT)
Age: 33
End: 2023-03-22

## 2023-03-22 DIAGNOSIS — Z90.49 ACQUIRED ABSENCE OF OTHER SPECIFIED PARTS OF DIGESTIVE TRACT: Chronic | ICD-10-CM

## 2023-03-22 DIAGNOSIS — Z98.890 OTHER SPECIFIED POSTPROCEDURAL STATES: Chronic | ICD-10-CM

## 2023-03-22 DIAGNOSIS — Z34.90 ENCOUNTER FOR SUPERVISION OF NORMAL PREGNANCY, UNSPECIFIED, UNSPECIFIED TRIMESTER: ICD-10-CM

## 2023-03-22 PROCEDURE — 76816 OB US FOLLOW-UP PER FETUS: CPT

## 2023-03-22 PROCEDURE — 76816 OB US FOLLOW-UP PER FETUS: CPT | Mod: 26

## 2023-03-23 ENCOUNTER — APPOINTMENT (OUTPATIENT)
Dept: OBGYN | Facility: CLINIC | Age: 33
End: 2023-03-23
Payer: COMMERCIAL

## 2023-03-23 VITALS — BODY MASS INDEX: 39.86 KG/M2 | DIASTOLIC BLOOD PRESSURE: 78 MMHG | SYSTOLIC BLOOD PRESSURE: 106 MMHG | WEIGHT: 225 LBS

## 2023-03-23 LAB
BILIRUB UR QL STRIP: NORMAL
GLUCOSE UR-MCNC: NORMAL
HCG UR QL: 0.2 EU/DL
HGB UR QL STRIP.AUTO: NORMAL
KETONES UR-MCNC: NORMAL
LEUKOCYTE ESTERASE UR QL STRIP: NORMAL
NITRITE UR QL STRIP: NORMAL
PH UR STRIP: 7
PROT UR STRIP-MCNC: NORMAL
SP GR UR STRIP: 1.01

## 2023-03-23 PROCEDURE — 0502F SUBSEQUENT PRENATAL CARE: CPT | Mod: NC

## 2023-03-23 PROCEDURE — 81003 URINALYSIS AUTO W/O SCOPE: CPT | Mod: QW

## 2023-03-24 DIAGNOSIS — Z3A.33 33 WEEKS GESTATION OF PREGNANCY: ICD-10-CM

## 2023-03-24 DIAGNOSIS — O99.212 OBESITY COMPLICATING PREGNANCY, SECOND TRIMESTER: ICD-10-CM

## 2023-03-29 ENCOUNTER — APPOINTMENT (OUTPATIENT)
Dept: ANTEPARTUM | Facility: CLINIC | Age: 33
End: 2023-03-29

## 2023-03-29 ENCOUNTER — APPOINTMENT (OUTPATIENT)
Dept: OBGYN | Facility: CLINIC | Age: 33
End: 2023-03-29
Payer: COMMERCIAL

## 2023-03-29 ENCOUNTER — NON-APPOINTMENT (OUTPATIENT)
Age: 33
End: 2023-03-29

## 2023-03-29 VITALS — BODY MASS INDEX: 39.86 KG/M2 | WEIGHT: 225 LBS | DIASTOLIC BLOOD PRESSURE: 74 MMHG | SYSTOLIC BLOOD PRESSURE: 113 MMHG

## 2023-03-29 DIAGNOSIS — N63.10 UNSPECIFIED LUMP IN THE RIGHT BREAST, UNSPECIFIED QUADRANT: ICD-10-CM

## 2023-03-29 PROCEDURE — 76819 FETAL BIOPHYS PROFIL W/O NST: CPT

## 2023-03-29 PROCEDURE — 0502F SUBSEQUENT PRENATAL CARE: CPT | Mod: NC

## 2023-03-29 PROCEDURE — 90471 IMMUNIZATION ADMIN: CPT

## 2023-03-29 PROCEDURE — 81003 URINALYSIS AUTO W/O SCOPE: CPT | Mod: QW

## 2023-03-29 PROCEDURE — 90715 TDAP VACCINE 7 YRS/> IM: CPT

## 2023-03-29 RX ORDER — LANCETS 28 GAUGE
EACH MISCELLANEOUS
Qty: 100 | Refills: 2 | Status: ACTIVE | COMMUNITY
Start: 2023-03-29 | End: 1900-01-01

## 2023-03-29 RX ORDER — ISOPROPYL ALCOHOL 70 ML/100ML
SWAB TOPICAL
Qty: 200 | Refills: 5 | Status: ACTIVE | COMMUNITY
Start: 2023-03-29 | End: 1900-01-01

## 2023-03-29 RX ORDER — BLOOD SUGAR DIAGNOSTIC
STRIP MISCELLANEOUS 4 TIMES DAILY
Qty: 100 | Refills: 2 | Status: ACTIVE | COMMUNITY
Start: 2023-03-29 | End: 1900-01-01

## 2023-03-29 RX ORDER — BLOOD-GLUCOSE METER
W/DEVICE KIT MISCELLANEOUS
Qty: 1 | Refills: 0 | Status: ACTIVE | COMMUNITY
Start: 2023-03-29 | End: 1900-01-01

## 2023-04-05 ENCOUNTER — APPOINTMENT (OUTPATIENT)
Dept: ANTEPARTUM | Facility: CLINIC | Age: 33
End: 2023-04-05

## 2023-04-05 ENCOUNTER — APPOINTMENT (OUTPATIENT)
Dept: OBGYN | Facility: CLINIC | Age: 33
End: 2023-04-05
Payer: COMMERCIAL

## 2023-04-05 VITALS — SYSTOLIC BLOOD PRESSURE: 104 MMHG | WEIGHT: 223 LBS | DIASTOLIC BLOOD PRESSURE: 71 MMHG | BODY MASS INDEX: 39.5 KG/M2

## 2023-04-05 LAB
BILIRUB UR QL STRIP: NORMAL
GLUCOSE UR-MCNC: NORMAL
HCG UR QL: 0.2 EU/DL
HGB UR QL STRIP.AUTO: NORMAL
KETONES UR-MCNC: NORMAL
LEUKOCYTE ESTERASE UR QL STRIP: NORMAL
NITRITE UR QL STRIP: NORMAL
PH UR STRIP: 6.5
PROT UR STRIP-MCNC: NORMAL
SP GR UR STRIP: 1.02

## 2023-04-05 PROCEDURE — 0502F SUBSEQUENT PRENATAL CARE: CPT | Mod: NC

## 2023-04-05 PROCEDURE — 76819 FETAL BIOPHYS PROFIL W/O NST: CPT

## 2023-04-05 PROCEDURE — 81003 URINALYSIS AUTO W/O SCOPE: CPT | Mod: QW

## 2023-04-11 ENCOUNTER — ASOB RESULT (OUTPATIENT)
Age: 33
End: 2023-04-11

## 2023-04-11 ENCOUNTER — OUTPATIENT (OUTPATIENT)
Dept: OUTPATIENT SERVICES | Facility: HOSPITAL | Age: 33
LOS: 1 days | End: 2023-04-11
Payer: COMMERCIAL

## 2023-04-11 ENCOUNTER — NON-APPOINTMENT (OUTPATIENT)
Age: 33
End: 2023-04-11

## 2023-04-11 ENCOUNTER — APPOINTMENT (OUTPATIENT)
Dept: ANTEPARTUM | Facility: CLINIC | Age: 33
End: 2023-04-11
Payer: COMMERCIAL

## 2023-04-11 ENCOUNTER — APPOINTMENT (OUTPATIENT)
Dept: OBGYN | Facility: CLINIC | Age: 33
End: 2023-04-11

## 2023-04-11 DIAGNOSIS — Z98.890 OTHER SPECIFIED POSTPROCEDURAL STATES: Chronic | ICD-10-CM

## 2023-04-11 DIAGNOSIS — Z34.90 ENCOUNTER FOR SUPERVISION OF NORMAL PREGNANCY, UNSPECIFIED, UNSPECIFIED TRIMESTER: ICD-10-CM

## 2023-04-11 DIAGNOSIS — Z90.49 ACQUIRED ABSENCE OF OTHER SPECIFIED PARTS OF DIGESTIVE TRACT: Chronic | ICD-10-CM

## 2023-04-11 PROCEDURE — 76816 OB US FOLLOW-UP PER FETUS: CPT

## 2023-04-11 PROCEDURE — 76819 FETAL BIOPHYS PROFIL W/O NST: CPT | Mod: 26

## 2023-04-11 PROCEDURE — 76816 OB US FOLLOW-UP PER FETUS: CPT | Mod: 26

## 2023-04-11 PROCEDURE — 76819 FETAL BIOPHYS PROFIL W/O NST: CPT

## 2023-04-12 LAB
BILIRUB UR QL STRIP: NORMAL
GLUCOSE UR-MCNC: 500
HCG UR QL: 0.2 EU/DL
HGB UR QL STRIP.AUTO: NORMAL
KETONES UR-MCNC: NORMAL
LEUKOCYTE ESTERASE UR QL STRIP: NORMAL
NITRITE UR QL STRIP: NORMAL
PH UR STRIP: 6.5
PROT UR STRIP-MCNC: NORMAL
SP GR UR STRIP: 1.01

## 2023-04-17 DIAGNOSIS — O36.63X0 MATERNAL CARE FOR EXCESSIVE FETAL GROWTH, THIRD TRIMESTER, NOT APPLICABLE OR UNSPECIFIED: ICD-10-CM

## 2023-04-17 DIAGNOSIS — Z3A.36 36 WEEKS GESTATION OF PREGNANCY: ICD-10-CM

## 2023-04-17 DIAGNOSIS — O40.3XX0 POLYHYDRAMNIOS, THIRD TRIMESTER, NOT APPLICABLE OR UNSPECIFIED: ICD-10-CM

## 2023-04-17 DIAGNOSIS — O99.213 OBESITY COMPLICATING PREGNANCY, THIRD TRIMESTER: ICD-10-CM

## 2023-04-19 ENCOUNTER — APPOINTMENT (OUTPATIENT)
Dept: OBGYN | Facility: CLINIC | Age: 33
End: 2023-04-19
Payer: COMMERCIAL

## 2023-04-19 ENCOUNTER — APPOINTMENT (OUTPATIENT)
Dept: ANTEPARTUM | Facility: CLINIC | Age: 33
End: 2023-04-19

## 2023-04-19 VITALS — SYSTOLIC BLOOD PRESSURE: 134 MMHG | DIASTOLIC BLOOD PRESSURE: 75 MMHG | WEIGHT: 228 LBS | BODY MASS INDEX: 40.39 KG/M2

## 2023-04-19 LAB
BILIRUB UR QL STRIP: NORMAL
GLUCOSE UR-MCNC: 1000
HCG UR QL: 0.2 EU/DL
HGB UR QL STRIP.AUTO: NORMAL
KETONES UR-MCNC: NORMAL
LEUKOCYTE ESTERASE UR QL STRIP: NORMAL
NITRITE UR QL STRIP: NORMAL
PH UR STRIP: 6.5
PROT UR STRIP-MCNC: NORMAL
SP GR UR STRIP: 1.02

## 2023-04-19 PROCEDURE — 36415 COLL VENOUS BLD VENIPUNCTURE: CPT

## 2023-04-19 PROCEDURE — 0502F SUBSEQUENT PRENATAL CARE: CPT | Mod: NC

## 2023-04-19 PROCEDURE — 81003 URINALYSIS AUTO W/O SCOPE: CPT | Mod: QW

## 2023-04-19 PROCEDURE — 76818 FETAL BIOPHYS PROFILE W/NST: CPT

## 2023-04-21 ENCOUNTER — APPOINTMENT (OUTPATIENT)
Dept: OBGYN | Facility: CLINIC | Age: 33
End: 2023-04-21

## 2023-04-21 ENCOUNTER — APPOINTMENT (OUTPATIENT)
Dept: OBGYN | Facility: CLINIC | Age: 33
End: 2023-04-21
Payer: COMMERCIAL

## 2023-04-21 VITALS — BODY MASS INDEX: 40.03 KG/M2 | WEIGHT: 226 LBS | DIASTOLIC BLOOD PRESSURE: 80 MMHG | SYSTOLIC BLOOD PRESSURE: 122 MMHG

## 2023-04-21 LAB
BASOPHILS # BLD AUTO: 0.02 K/UL
BASOPHILS NFR BLD AUTO: 0.2 %
BILIRUB UR QL STRIP: NORMAL
EOSINOPHIL # BLD AUTO: 0.15 K/UL
EOSINOPHIL NFR BLD AUTO: 1.6 %
GLUCOSE UR-MCNC: NORMAL
HCG UR QL: 1 EU/DL
HCT VFR BLD CALC: 37.2 %
HCV AB SER QL: NONREACTIVE
HCV S/CO RATIO: 0.12 S/CO
HGB BLD-MCNC: 11.7 G/DL
HGB UR QL STRIP.AUTO: NORMAL
HIV1+2 AB SPEC QL IA.RAPID: NONREACTIVE
IMM GRANULOCYTES NFR BLD AUTO: 0.5 %
KETONES UR-MCNC: NORMAL
LEUKOCYTE ESTERASE UR QL STRIP: NORMAL
LYMPHOCYTES # BLD AUTO: 2.09 K/UL
LYMPHOCYTES NFR BLD AUTO: 22.5 %
MAN DIFF?: NORMAL
MCHC RBC-ENTMCNC: 28.7 PG
MCHC RBC-ENTMCNC: 31.5 GM/DL
MCV RBC AUTO: 91.2 FL
MONOCYTES # BLD AUTO: 0.68 K/UL
MONOCYTES NFR BLD AUTO: 7.3 %
NEUTROPHILS # BLD AUTO: 6.31 K/UL
NEUTROPHILS NFR BLD AUTO: 67.9 %
NITRITE UR QL STRIP: NORMAL
PH UR STRIP: 7
PLATELET # BLD AUTO: 199 K/UL
PROT UR STRIP-MCNC: NORMAL
RBC # BLD: 4.08 M/UL
RBC # FLD: 13.4 %
SP GR UR STRIP: 1.02
WBC # FLD AUTO: 9.3 K/UL

## 2023-04-21 PROCEDURE — 0502F SUBSEQUENT PRENATAL CARE: CPT | Mod: NC

## 2023-04-21 PROCEDURE — 76818 FETAL BIOPHYS PROFILE W/NST: CPT

## 2023-04-21 PROCEDURE — 81003 URINALYSIS AUTO W/O SCOPE: CPT | Mod: QW

## 2023-04-21 RX ORDER — METFORMIN HYDROCHLORIDE 500 MG/1
500 TABLET, COATED ORAL DAILY
Qty: 30 | Refills: 1 | Status: ACTIVE | COMMUNITY
Start: 2023-04-21 | End: 1900-01-01

## 2023-04-24 ENCOUNTER — APPOINTMENT (OUTPATIENT)
Dept: OBGYN | Facility: CLINIC | Age: 33
End: 2023-04-24
Payer: COMMERCIAL

## 2023-04-24 VITALS — DIASTOLIC BLOOD PRESSURE: 75 MMHG | SYSTOLIC BLOOD PRESSURE: 113 MMHG | BODY MASS INDEX: 40.39 KG/M2 | WEIGHT: 228 LBS

## 2023-04-24 LAB
B-HEM STREP SPEC QL CULT: NORMAL
BILIRUB UR QL STRIP: NORMAL
COLLECTION METHOD: NORMAL
GLUCOSE UR-MCNC: NORMAL
HCG UR QL: 1 EU/DL
HGB UR QL STRIP.AUTO: NORMAL
KETONES UR-MCNC: NORMAL
LEUKOCYTE ESTERASE UR QL STRIP: NORMAL
NITRITE UR QL STRIP: NORMAL
PH UR STRIP: 7
PROT UR STRIP-MCNC: NORMAL
RPR SER-TITR: NORMAL
SP GR UR STRIP: 1.01

## 2023-04-24 PROCEDURE — 0502F SUBSEQUENT PRENATAL CARE: CPT | Mod: NC

## 2023-04-24 PROCEDURE — 81003 URINALYSIS AUTO W/O SCOPE: CPT | Mod: QW

## 2023-04-24 PROCEDURE — 76818 FETAL BIOPHYS PROFILE W/NST: CPT

## 2023-04-25 ENCOUNTER — INPATIENT (INPATIENT)
Facility: HOSPITAL | Age: 33
LOS: 2 days | Discharge: HOME | End: 2023-04-28
Attending: OBSTETRICS & GYNECOLOGY | Admitting: OBSTETRICS & GYNECOLOGY
Payer: COMMERCIAL

## 2023-04-25 ENCOUNTER — APPOINTMENT (OUTPATIENT)
Dept: ANTEPARTUM | Facility: CLINIC | Age: 33
End: 2023-04-25

## 2023-04-25 ENCOUNTER — NON-APPOINTMENT (OUTPATIENT)
Age: 33
End: 2023-04-25

## 2023-04-25 VITALS — SYSTOLIC BLOOD PRESSURE: 118 MMHG | DIASTOLIC BLOOD PRESSURE: 60 MMHG | HEART RATE: 91 BPM

## 2023-04-25 DIAGNOSIS — Z98.890 OTHER SPECIFIED POSTPROCEDURAL STATES: Chronic | ICD-10-CM

## 2023-04-25 DIAGNOSIS — Z90.49 ACQUIRED ABSENCE OF OTHER SPECIFIED PARTS OF DIGESTIVE TRACT: Chronic | ICD-10-CM

## 2023-04-25 DIAGNOSIS — O26.893 OTHER SPECIFIED PREGNANCY RELATED CONDITIONS, THIRD TRIMESTER: ICD-10-CM

## 2023-04-25 LAB
AMPHET UR-MCNC: NEGATIVE — SIGNIFICANT CHANGE UP
APPEARANCE UR: CLEAR — SIGNIFICANT CHANGE UP
BARBITURATES UR SCN-MCNC: NEGATIVE — SIGNIFICANT CHANGE UP
BASOPHILS # BLD AUTO: 0.01 K/UL — SIGNIFICANT CHANGE UP (ref 0–0.2)
BASOPHILS NFR BLD AUTO: 0.1 % — SIGNIFICANT CHANGE UP (ref 0–1)
BENZODIAZ UR-MCNC: NEGATIVE — SIGNIFICANT CHANGE UP
BILIRUB UR-MCNC: NEGATIVE — SIGNIFICANT CHANGE UP
BLD GP AB SCN SERPL QL: SIGNIFICANT CHANGE UP
BUPRENORPHINE SCREEN, URINE RESULT: NEGATIVE — SIGNIFICANT CHANGE UP
COCAINE METAB.OTHER UR-MCNC: NEGATIVE — SIGNIFICANT CHANGE UP
COLOR SPEC: SIGNIFICANT CHANGE UP
COVID-19 SPIKE DOMAIN AB INTERP: POSITIVE
COVID-19 SPIKE DOMAIN ANTIBODY RESULT: >250 U/ML — HIGH
DIFF PNL FLD: NEGATIVE — SIGNIFICANT CHANGE UP
EOSINOPHIL # BLD AUTO: 0.13 K/UL — SIGNIFICANT CHANGE UP (ref 0–0.7)
EOSINOPHIL NFR BLD AUTO: 1.7 % — SIGNIFICANT CHANGE UP (ref 0–8)
FENTANYL UR QL: NEGATIVE — SIGNIFICANT CHANGE UP
GLUCOSE BLDC GLUCOMTR-MCNC: 111 MG/DL — HIGH (ref 70–99)
GLUCOSE BLDC GLUCOMTR-MCNC: 56 MG/DL — LOW (ref 70–99)
GLUCOSE BLDC GLUCOMTR-MCNC: 65 MG/DL — LOW (ref 70–99)
GLUCOSE BLDC GLUCOMTR-MCNC: 69 MG/DL — LOW (ref 70–99)
GLUCOSE BLDC GLUCOMTR-MCNC: 86 MG/DL — SIGNIFICANT CHANGE UP (ref 70–99)
GLUCOSE BLDC GLUCOMTR-MCNC: 98 MG/DL — SIGNIFICANT CHANGE UP (ref 70–99)
GLUCOSE UR QL: NEGATIVE — SIGNIFICANT CHANGE UP
HCT VFR BLD CALC: 32 % — LOW (ref 37–47)
HGB BLD-MCNC: 10.7 G/DL — LOW (ref 12–16)
IMM GRANULOCYTES NFR BLD AUTO: 0.5 % — HIGH (ref 0.1–0.3)
KETONES UR-MCNC: NEGATIVE — SIGNIFICANT CHANGE UP
L&D DRUG SCREEN, URINE: SIGNIFICANT CHANGE UP
LEUKOCYTE ESTERASE UR-ACNC: NEGATIVE — SIGNIFICANT CHANGE UP
LYMPHOCYTES # BLD AUTO: 2.03 K/UL — SIGNIFICANT CHANGE UP (ref 1.2–3.4)
LYMPHOCYTES # BLD AUTO: 26.2 % — SIGNIFICANT CHANGE UP (ref 20.5–51.1)
MCHC RBC-ENTMCNC: 28.8 PG — SIGNIFICANT CHANGE UP (ref 27–31)
MCHC RBC-ENTMCNC: 33.4 G/DL — SIGNIFICANT CHANGE UP (ref 32–37)
MCV RBC AUTO: 86.3 FL — SIGNIFICANT CHANGE UP (ref 81–99)
METHADONE UR-MCNC: NEGATIVE — SIGNIFICANT CHANGE UP
MONOCYTES # BLD AUTO: 0.68 K/UL — HIGH (ref 0.1–0.6)
MONOCYTES NFR BLD AUTO: 8.8 % — SIGNIFICANT CHANGE UP (ref 1.7–9.3)
NEUTROPHILS # BLD AUTO: 4.87 K/UL — SIGNIFICANT CHANGE UP (ref 1.4–6.5)
NEUTROPHILS NFR BLD AUTO: 62.7 % — SIGNIFICANT CHANGE UP (ref 42.2–75.2)
NITRITE UR-MCNC: NEGATIVE — SIGNIFICANT CHANGE UP
NRBC # BLD: 0 /100 WBCS — SIGNIFICANT CHANGE UP (ref 0–0)
OPIATES UR-MCNC: NEGATIVE — SIGNIFICANT CHANGE UP
OXYCODONE UR-MCNC: NEGATIVE — SIGNIFICANT CHANGE UP
PCP UR-MCNC: NEGATIVE — SIGNIFICANT CHANGE UP
PH UR: 7 — SIGNIFICANT CHANGE UP (ref 5–8)
PLATELET # BLD AUTO: 193 K/UL — SIGNIFICANT CHANGE UP (ref 130–400)
PMV BLD: 12.4 FL — HIGH (ref 7.4–10.4)
PRENATAL SYPHILIS TEST: SIGNIFICANT CHANGE UP
PROPOXYPHENE QUALITATIVE URINE RESULT: NEGATIVE — SIGNIFICANT CHANGE UP
PROT UR-MCNC: NEGATIVE — SIGNIFICANT CHANGE UP
RAPID RVP RESULT: SIGNIFICANT CHANGE UP
RBC # BLD: 3.71 M/UL — LOW (ref 4.2–5.4)
RBC # FLD: 13.1 % — SIGNIFICANT CHANGE UP (ref 11.5–14.5)
SARS-COV-2 IGG+IGM SERPL QL IA: >250 U/ML — HIGH
SARS-COV-2 IGG+IGM SERPL QL IA: POSITIVE
SARS-COV-2 RNA SPEC QL NAA+PROBE: SIGNIFICANT CHANGE UP
SP GR SPEC: 1.01 — SIGNIFICANT CHANGE UP (ref 1.01–1.03)
UROBILINOGEN FLD QL: SIGNIFICANT CHANGE UP
WBC # BLD: 7.76 K/UL — SIGNIFICANT CHANGE UP (ref 4.8–10.8)
WBC # FLD AUTO: 7.76 K/UL — SIGNIFICANT CHANGE UP (ref 4.8–10.8)

## 2023-04-25 PROCEDURE — 85025 COMPLETE CBC W/AUTO DIFF WBC: CPT

## 2023-04-25 PROCEDURE — 86901 BLOOD TYPING SEROLOGIC RH(D): CPT

## 2023-04-25 PROCEDURE — 59400 OBSTETRICAL CARE: CPT

## 2023-04-25 PROCEDURE — 80354 DRUG SCREENING FENTANYL: CPT

## 2023-04-25 PROCEDURE — 88307 TISSUE EXAM BY PATHOLOGIST: CPT

## 2023-04-25 PROCEDURE — 86769 SARS-COV-2 COVID-19 ANTIBODY: CPT

## 2023-04-25 PROCEDURE — 36415 COLL VENOUS BLD VENIPUNCTURE: CPT

## 2023-04-25 PROCEDURE — 71046 X-RAY EXAM CHEST 2 VIEWS: CPT

## 2023-04-25 PROCEDURE — 86592 SYPHILIS TEST NON-TREP QUAL: CPT

## 2023-04-25 PROCEDURE — 59050 FETAL MONITOR W/REPORT: CPT

## 2023-04-25 PROCEDURE — 87086 URINE CULTURE/COLONY COUNT: CPT

## 2023-04-25 PROCEDURE — 86850 RBC ANTIBODY SCREEN: CPT

## 2023-04-25 PROCEDURE — 81003 URINALYSIS AUTO W/O SCOPE: CPT

## 2023-04-25 PROCEDURE — 82962 GLUCOSE BLOOD TEST: CPT

## 2023-04-25 PROCEDURE — 0225U NFCT DS DNA&RNA 21 SARSCOV2: CPT

## 2023-04-25 PROCEDURE — 80307 DRUG TEST PRSMV CHEM ANLYZR: CPT

## 2023-04-25 PROCEDURE — 86900 BLOOD TYPING SEROLOGIC ABO: CPT

## 2023-04-25 RX ORDER — CHLORHEXIDINE GLUCONATE 213 G/1000ML
1 SOLUTION TOPICAL ONCE
Refills: 0 | Status: DISCONTINUED | OUTPATIENT
Start: 2023-04-25 | End: 2023-04-26

## 2023-04-25 RX ORDER — FENTANYL/BUPIVACAINE/NS/PF 2MCG/ML-.1
250 PLASTIC BAG, INJECTION (ML) INJECTION
Refills: 0 | Status: DISCONTINUED | OUTPATIENT
Start: 2023-04-25 | End: 2023-04-26

## 2023-04-25 RX ORDER — ACETAMINOPHEN 500 MG
1000 TABLET ORAL ONCE
Refills: 0 | Status: COMPLETED | OUTPATIENT
Start: 2023-04-25 | End: 2023-04-25

## 2023-04-25 RX ORDER — AMPICILLIN TRIHYDRATE 250 MG
2 CAPSULE ORAL ONCE
Refills: 0 | Status: COMPLETED | OUTPATIENT
Start: 2023-04-25 | End: 2023-04-25

## 2023-04-25 RX ORDER — SODIUM CHLORIDE 9 MG/ML
1000 INJECTION, SOLUTION INTRAVENOUS
Refills: 0 | Status: DISCONTINUED | OUTPATIENT
Start: 2023-04-25 | End: 2023-04-26

## 2023-04-25 RX ORDER — CITRIC ACID/SODIUM CITRATE 300-500 MG
15 SOLUTION, ORAL ORAL EVERY 6 HOURS
Refills: 0 | Status: DISCONTINUED | OUTPATIENT
Start: 2023-04-25 | End: 2023-04-26

## 2023-04-25 RX ORDER — ONDANSETRON 8 MG/1
4 TABLET, FILM COATED ORAL EVERY 6 HOURS
Refills: 0 | Status: DISCONTINUED | OUTPATIENT
Start: 2023-04-25 | End: 2023-04-26

## 2023-04-25 RX ORDER — AMPICILLIN TRIHYDRATE 250 MG
CAPSULE ORAL
Refills: 0 | Status: DISCONTINUED | OUTPATIENT
Start: 2023-04-25 | End: 2023-04-27

## 2023-04-25 RX ORDER — OXYTOCIN 10 UNIT/ML
2 VIAL (ML) INJECTION
Qty: 30 | Refills: 0 | Status: DISCONTINUED | OUTPATIENT
Start: 2023-04-25 | End: 2023-04-26

## 2023-04-25 RX ORDER — OXYTOCIN 10 UNIT/ML
333.33 VIAL (ML) INJECTION
Qty: 20 | Refills: 0 | Status: DISCONTINUED | OUTPATIENT
Start: 2023-04-25 | End: 2023-04-26

## 2023-04-25 RX ORDER — NALOXONE HYDROCHLORIDE 4 MG/.1ML
0.1 SPRAY NASAL
Refills: 0 | Status: DISCONTINUED | OUTPATIENT
Start: 2023-04-25 | End: 2023-04-26

## 2023-04-25 RX ORDER — GENTAMICIN SULFATE 40 MG/ML
80 VIAL (ML) INJECTION EVERY 8 HOURS
Refills: 0 | Status: DISCONTINUED | OUTPATIENT
Start: 2023-04-25 | End: 2023-04-27

## 2023-04-25 RX ORDER — ACETAMINOPHEN 500 MG
650 TABLET ORAL ONCE
Refills: 0 | Status: COMPLETED | OUTPATIENT
Start: 2023-04-25 | End: 2023-04-25

## 2023-04-25 RX ORDER — DEXAMETHASONE 0.5 MG/5ML
4 ELIXIR ORAL EVERY 6 HOURS
Refills: 0 | Status: DISCONTINUED | OUTPATIENT
Start: 2023-04-25 | End: 2023-04-26

## 2023-04-25 RX ORDER — AMPICILLIN TRIHYDRATE 250 MG
2 CAPSULE ORAL EVERY 6 HOURS
Refills: 0 | Status: DISCONTINUED | OUTPATIENT
Start: 2023-04-26 | End: 2023-04-27

## 2023-04-25 RX ADMIN — Medication 104 MILLIGRAM(S): at 20:32

## 2023-04-25 RX ADMIN — SODIUM CHLORIDE 125 MILLILITER(S): 9 INJECTION, SOLUTION INTRAVENOUS at 05:17

## 2023-04-25 RX ADMIN — Medication 0.2 MILLIGRAM(S): at 23:32

## 2023-04-25 RX ADMIN — Medication 200 GRAM(S): at 20:10

## 2023-04-25 RX ADMIN — Medication 2 MILLIUNIT(S)/MIN: at 06:20

## 2023-04-25 RX ADMIN — Medication 400 MILLIGRAM(S): at 20:58

## 2023-04-25 RX ADMIN — Medication 650 MILLIGRAM(S): at 19:52

## 2023-04-25 NOTE — CHART NOTE - NSCHARTNOTEFT_GEN_A_CORE
PGY4 Note    Patient was evaluated at bedside for fetal tachycardia.  O2 given. Pitocin discontinued.  Maternal temperature noted to be 100.9.  Amp/gent started for chorioamnionitis.      EFM: 220/mod/+accel, cat II  [place]: q2m  SVE: 8-9/80/-2 @2020    Rectal tylenol ordered. Will monitor closely.    Dr. Downey at bedside.

## 2023-04-25 NOTE — OB PROVIDER H&P - NSLOWPPHRISK_OBGYN_A_OB
No previous uterine incision/Paez Pregnancy/Less than or equal to 4 previous vaginal births/No known bleeding disorder/No history of postpartum hemorrhage

## 2023-04-25 NOTE — OB PROVIDER H&P - NS_OBGYNHISTORY_OBGYN_ALL_OB_FT
OB Hx:   1x FT  GDMA2 8-11  1x eTOPw/ d/c  Gyn Hx: Endorses h/o of HSV, HPV pos with colpo Denies h/o ovarian cysts, or fibroids

## 2023-04-25 NOTE — OB RN PATIENT PROFILE - NS_CIRCUMCISIONPLAN_OBGYN_ALL_OB
I spoke to her regarding the diagnosis and the mutation identified. Charmaine treats her sister for the CLL and she wants to see him Jeanne already made the arrangements.     We discussed watchful waiting and what criteria would precipitate needing treatment.
N/A

## 2023-04-25 NOTE — CHART NOTE - NSCHARTNOTEFT_GEN_A_CORE
PGY 3 note    Patient re-examined by Dr. Downey, she is 9.5/90/-1, reducible, will turn off epidural pump. Will continue to monitor.    Dr. Downey at bedside PGY 3 note    Patient re-examined by Dr. Downey, she is 9.5/90/-1, reducible, will turn off epidural pump. Will continue to monitor.    EFM: 175/mod variability/accels +  toco: q3mins    Dr. Downey at bedside

## 2023-04-25 NOTE — OB RN DELIVERY SUMMARY - NSSELHIDDEN_OBGYN_ALL_OB_FT
[NS_DeliveryAttending1_OBGYN_ALL_OB_FT:[email or web address]=],[NS_DeliveryRN_OBGYN_ALL_OB_FT:[id number]=],[NS_CirculateRN2_OBGYN_ALL_OB_FT:[id number]=]

## 2023-04-25 NOTE — OB PROVIDER H&P - ASSESSMENT
33 yo  @ 38w1d, h/o HSV1 on valtrex, macrosomia, IOL for GDMA2 uncontrolled    -admit to labor and delivery  -pain management prn   -continous efm & toco  -admission labs  -IV access   -IV hydration   -diet: clear liquid diet   -FS: Q4h latent labor Q2h active labor    Dr. Nevarez and Dr. Gama aware 31 yo  @ 38w1d, h/o HSV1 on valtrex, macrosomia, polyhydramnios, IOL for GDMA2 uncontrolled    -admit to labor and delivery  -pain management prn   -continous efm & toco  -admission labs  -IV access   -IV hydration   -diet: clear liquid diet   -FS: Q4h latent labor Q2h active labor    Dr. Nevarez and Dr. Gama aware 33 yo  @ 38w1d, h/o HSV1, polyhydramnios, IOL for GDMA2 uncontrolled    -admit to labor and delivery  -pain management prn   -continous efm & toco  -admission labs  -IV access   -IV hydration   -diet: clear liquid diet   -FS: Q4h latent labor Q2h active labor    Dr. Nevarez and Dr. Gama aware

## 2023-04-25 NOTE — OB PROVIDER H&P - HISTORY OF PRESENT ILLNESS
33 yo  @ 38w2d by 1st trimester U/S, REYNA 23, presents for IOL for uncontrolled GDMA2. Patient denies any cx, vb, lof. Endorses good fetal movement. H/o of GDMA2, elevated GCT, nml GTT, 35 weeks oligohydramnios and sugar in the urine. FS have been uncontrolled, on metformin. GBS neg. 33 yo  @ 38w2d by 1st trimester U/S, REYNA 23, presents for IOL for uncontrolled GDMA2. Patient denies any cx, vb, lof. Endorses good fetal movement. H/o of GDMA2, elevated GCT, nml GTT, 35 weeks oligohydramnios and sugar in the urine. FS have been uncontrolled, on metformin for only 3 days. h/o of HSV lesions pre 1st pregnancy, not on valtrex. GBS neg. 33 yo  @ 38w2d by 1st trimester U/S, REYNA 23, presents for IOL for uncontrolled GDMA2. She had an elevated GCT with a normal GTT. however at 35w she was noted to be polyhydramnios with glucose in Udip, she was avised to do FS at home, noted have elevated 2hr postprandial FS. Patient was placed on Metformin 3 days ago. Case discussed with MFM attending, who recommended delivery at 38w. Patient denies any cx, vb, lof. Endorses good fetal movement. H/o of HSV lesions pre 1st pregnancy, not on valtrex. GBS neg.

## 2023-04-25 NOTE — PROGRESS NOTE ADULT - ASSESSMENT
31 yo  @ 38w1d, h/o HSV1, no prodromal symptoms or lesions currently, polyhydramnios, IOL for poorly controlled GDMA2, on pitocin, s/p epidural and AROM, doing well.    -continue pitocin  -pain management prn  -cont efm/toco  -cont to monitor vitals  -cont iv hydration    Dr. Downey at bedside.

## 2023-04-25 NOTE — OB PROVIDER H&P - ATTENDING COMMENTS
Pt is a 31 yo  @ 38w1d, h/o HSV1, polyhydramnios, IOL for GDMA2 uncontrolled, and diagnosed late. Case has been reviewed w/ MFM and in agreement with the IOL. Clinilca ef 8.5lbs, Fhr Cat1, Cvx 2cm/60%/-3, gynecoid pelvis.   -admit   -blood sugars w/ FS  -oxytocin IOL  -Epidural aesthesia prn.

## 2023-04-25 NOTE — OB PROVIDER H&P - NSICDXPASTMEDICALHX_GEN_ALL_CORE_FT
PAST MEDICAL HISTORY:  Gestational diabetes     HSV infection     No pertinent past medical history

## 2023-04-25 NOTE — PROCEDURE NOTE - ADDITIONAL PROCEDURE DETAILS
Thorough discussion of patient's history, as indicated above.  Discussed risks of epidural, including PDPH, inadequate analgesia occasionally requiring epidural catheter replacement, bleeding, infection and spinal cord injury.  Patient expressed understanding of these risks, signed informed consent and wishes to proceed with epidural catheter insertion.  Lumbar dural puncture epidural performed at L3-4. Standard ASA monitors including BP, pulse oximetry, FHR. Sterile gloves, chlorhexidine prep. 1% lidocaine for local infiltration. 17g Touhy. LARRY to saline at 6 cm.  27G Zuly spinal needle inserted through epidural needle.  +CSF/Negative heme or paresthesias.  Zuly needle removed.  Epidural catheter threaded easily to 12 cm. Touhy needle removed. Catheter secured in place. Aspiration negative for blood/CSF. Test dose consisting of 3ml 1.5% lidocaine with epinephrine was negative. Patient tolerated procedure, was hemodynamically stable throughout and did not complain of pain or paresthesias. T10 level bilaterally. Epidural PCEA infusion consisting of 250ml 0.0625% bupivacaine with fentanyl started, patient instructed to use PCEA button as needed for pain control.

## 2023-04-25 NOTE — OB PROVIDER H&P - NSHPLABSRESULTS_GEN_ALL_CORE
4/19/23  GBS neg  HIV NR    9/16/22  HbsAg NR  Trep NR  MMR Immune  B pos anti neg  HIV NR      Sono:   36w1d 8lb4oz >99% post placenta vtx  33w2d 5lb9oz 86%    21w2d 1lb anatomy wnl 4/19/23  GBS neg  HIV NR    9/16/22  HbsAg NR  Trep NR  MMR Immune  B pos anti neg  HIV NR      Sono:   36w1d 8lb4oz >99% post placenta vtx polyhydramnios  33w2d 5lb9oz 86%    21w2d 1lb anatomy wnl 4/19/23  GBS neg  HIV NR    9/16/22  HbsAg NR  Trep NR  MMR Immune  B pos anti neg  HIV NR    Sono:   36w1d 8lb4oz >99% post placenta vtx polyhydramnios  33w2d 5lb9oz 86%    21w2d 1lb anatomy wnl

## 2023-04-25 NOTE — OB PROVIDER H&P - NSHPPHYSICALEXAM_GEN_ALL_CORE
Physical Exam  Vital Signs Last 24 Hrs  T(F): --  HR: 91 (25 Apr 2023 04:52) (91 - 91)  BP: 118/60 (25 Apr 2023 04:52) (118/60 - 118/60)  RR: --    Gen: NAD, AOx3  Abdomen: soft, gravid, nontender, no palpable contractions  Cardio: RRR  Pulm:CTABL    EFM:150bpm/mod/pos accels  [place]:q  SVE:    BSS:vtx Physical Exam  Vital Signs Last 24 Hrs  T(F): --  HR: 91 (25 Apr 2023 04:52) (91 - 91)  BP: 118/60 (25 Apr 2023 04:52) (118/60 - 118/60)  RR: --    Gen: NAD, AOx3  Abdomen: soft, gravid, nontender, no palpable contractions  Cardio: RRR  Pulm:CTABL    EFM:150bpm/mod/pos accels  [place]:none  SVE:  Speculum: normal, no lesions     BSS:vtx Physical Exam  Vital Signs Last 24 Hrs  HR: 91 (25 Apr 2023 04:52) (91 - 91)  BP: 118/60 (25 Apr 2023 04:52) (118/60 - 118/60)    Gen: NAD, AOx3  Abdomen: soft, gravid, nontender, no palpable contractions  Cardio: RRR  Pulm:CTABL    EFM:150bpm/mod/pos accels  [place]:none  SVE: 2/60/-3  Speculum: normal, no lesions     BSS:vtx

## 2023-04-26 LAB
ANISOCYTOSIS BLD QL: SLIGHT — SIGNIFICANT CHANGE UP
BASOPHILS # BLD AUTO: 0 K/UL — SIGNIFICANT CHANGE UP (ref 0–0.2)
BASOPHILS NFR BLD AUTO: 0 % — SIGNIFICANT CHANGE UP (ref 0–1)
EOSINOPHIL # BLD AUTO: 0 K/UL — SIGNIFICANT CHANGE UP (ref 0–0.7)
EOSINOPHIL NFR BLD AUTO: 0 % — SIGNIFICANT CHANGE UP (ref 0–8)
HCT VFR BLD CALC: 28.6 % — LOW (ref 37–47)
HGB BLD-MCNC: 9.4 G/DL — LOW (ref 12–16)
LYMPHOCYTES # BLD AUTO: 0.94 K/UL — LOW (ref 1.2–3.4)
LYMPHOCYTES # BLD AUTO: 3.5 % — LOW (ref 20.5–51.1)
MANUAL SMEAR VERIFICATION: SIGNIFICANT CHANGE UP
MCHC RBC-ENTMCNC: 28.7 PG — SIGNIFICANT CHANGE UP (ref 27–31)
MCHC RBC-ENTMCNC: 32.9 G/DL — SIGNIFICANT CHANGE UP (ref 32–37)
MCV RBC AUTO: 87.2 FL — SIGNIFICANT CHANGE UP (ref 81–99)
METAMYELOCYTES # FLD: 1.8 % — HIGH (ref 0–0)
MICROCYTES BLD QL: SLIGHT — SIGNIFICANT CHANGE UP
MONOCYTES # BLD AUTO: 0.48 K/UL — SIGNIFICANT CHANGE UP (ref 0.1–0.6)
MONOCYTES NFR BLD AUTO: 1.8 % — SIGNIFICANT CHANGE UP (ref 1.7–9.3)
NEUTROPHILS # BLD AUTO: 25 K/UL — HIGH (ref 1.4–6.5)
NEUTROPHILS NFR BLD AUTO: 88.5 % — HIGH (ref 42.2–75.2)
NEUTS BAND # BLD: 4.4 % — SIGNIFICANT CHANGE UP (ref 0–6)
NRBC # BLD: 1 /100 — HIGH (ref 0–0)
NRBC # BLD: SIGNIFICANT CHANGE UP /100 WBCS (ref 0–0)
PLAT MORPH BLD: NORMAL — SIGNIFICANT CHANGE UP
PLATELET # BLD AUTO: 186 K/UL — SIGNIFICANT CHANGE UP (ref 130–400)
PMV BLD: 13.2 FL — HIGH (ref 7.4–10.4)
POIKILOCYTOSIS BLD QL AUTO: SIGNIFICANT CHANGE UP
POLYCHROMASIA BLD QL SMEAR: SLIGHT — SIGNIFICANT CHANGE UP
RBC # BLD: 3.28 M/UL — LOW (ref 4.2–5.4)
RBC # FLD: 13.4 % — SIGNIFICANT CHANGE UP (ref 11.5–14.5)
RBC BLD AUTO: NORMAL — SIGNIFICANT CHANGE UP
WBC # BLD: 26.91 K/UL — HIGH (ref 4.8–10.8)
WBC # FLD AUTO: 26.91 K/UL — HIGH (ref 4.8–10.8)

## 2023-04-26 PROCEDURE — 88307 TISSUE EXAM BY PATHOLOGIST: CPT | Mod: 26

## 2023-04-26 RX ORDER — HYDROCORTISONE 1 %
1 OINTMENT (GRAM) TOPICAL EVERY 6 HOURS
Refills: 0 | Status: DISCONTINUED | OUTPATIENT
Start: 2023-04-26 | End: 2023-04-28

## 2023-04-26 RX ORDER — MAGNESIUM HYDROXIDE 400 MG/1
30 TABLET, CHEWABLE ORAL
Refills: 0 | Status: DISCONTINUED | OUTPATIENT
Start: 2023-04-26 | End: 2023-04-28

## 2023-04-26 RX ORDER — AER TRAVELER 0.5 G/1
1 SOLUTION RECTAL; TOPICAL EVERY 4 HOURS
Refills: 0 | Status: DISCONTINUED | OUTPATIENT
Start: 2023-04-26 | End: 2023-04-28

## 2023-04-26 RX ORDER — OXYCODONE HYDROCHLORIDE 5 MG/1
5 TABLET ORAL
Refills: 0 | Status: DISCONTINUED | OUTPATIENT
Start: 2023-04-26 | End: 2023-04-28

## 2023-04-26 RX ORDER — DIPHENHYDRAMINE HCL 50 MG
25 CAPSULE ORAL EVERY 6 HOURS
Refills: 0 | Status: DISCONTINUED | OUTPATIENT
Start: 2023-04-26 | End: 2023-04-28

## 2023-04-26 RX ORDER — IBUPROFEN 200 MG
600 TABLET ORAL EVERY 6 HOURS
Refills: 0 | Status: COMPLETED | OUTPATIENT
Start: 2023-04-26 | End: 2024-03-24

## 2023-04-26 RX ORDER — SODIUM CHLORIDE 9 MG/ML
3 INJECTION INTRAMUSCULAR; INTRAVENOUS; SUBCUTANEOUS EVERY 8 HOURS
Refills: 0 | Status: DISCONTINUED | OUTPATIENT
Start: 2023-04-26 | End: 2023-04-28

## 2023-04-26 RX ORDER — KETOROLAC TROMETHAMINE 30 MG/ML
30 SYRINGE (ML) INJECTION ONCE
Refills: 0 | Status: DISCONTINUED | OUTPATIENT
Start: 2023-04-26 | End: 2023-04-26

## 2023-04-26 RX ORDER — DIBUCAINE 1 %
1 OINTMENT (GRAM) RECTAL EVERY 6 HOURS
Refills: 0 | Status: DISCONTINUED | OUTPATIENT
Start: 2023-04-26 | End: 2023-04-28

## 2023-04-26 RX ORDER — IBUPROFEN 200 MG
600 TABLET ORAL EVERY 6 HOURS
Refills: 0 | Status: DISCONTINUED | OUTPATIENT
Start: 2023-04-26 | End: 2023-04-28

## 2023-04-26 RX ORDER — PRAMOXINE HYDROCHLORIDE 150 MG/15G
1 AEROSOL, FOAM RECTAL EVERY 4 HOURS
Refills: 0 | Status: DISCONTINUED | OUTPATIENT
Start: 2023-04-26 | End: 2023-04-28

## 2023-04-26 RX ORDER — SIMETHICONE 80 MG/1
80 TABLET, CHEWABLE ORAL EVERY 4 HOURS
Refills: 0 | Status: DISCONTINUED | OUTPATIENT
Start: 2023-04-26 | End: 2023-04-28

## 2023-04-26 RX ORDER — OXYCODONE HYDROCHLORIDE 5 MG/1
5 TABLET ORAL ONCE
Refills: 0 | Status: DISCONTINUED | OUTPATIENT
Start: 2023-04-26 | End: 2023-04-28

## 2023-04-26 RX ORDER — TETANUS TOXOID, REDUCED DIPHTHERIA TOXOID AND ACELLULAR PERTUSSIS VACCINE, ADSORBED 5; 2.5; 8; 8; 2.5 [IU]/.5ML; [IU]/.5ML; UG/.5ML; UG/.5ML; UG/.5ML
0.5 SUSPENSION INTRAMUSCULAR ONCE
Refills: 0 | Status: DISCONTINUED | OUTPATIENT
Start: 2023-04-26 | End: 2023-04-28

## 2023-04-26 RX ORDER — GUAIFENESIN/DEXTROMETHORPHAN 600MG-30MG
5 TABLET, EXTENDED RELEASE 12 HR ORAL EVERY 6 HOURS
Refills: 0 | Status: DISCONTINUED | OUTPATIENT
Start: 2023-04-26 | End: 2023-04-28

## 2023-04-26 RX ORDER — ACETAMINOPHEN 500 MG
975 TABLET ORAL EVERY 6 HOURS
Refills: 0 | Status: DISCONTINUED | OUTPATIENT
Start: 2023-04-26 | End: 2023-04-28

## 2023-04-26 RX ORDER — BENZOCAINE 10 %
1 GEL (GRAM) MUCOUS MEMBRANE EVERY 6 HOURS
Refills: 0 | Status: DISCONTINUED | OUTPATIENT
Start: 2023-04-26 | End: 2023-04-28

## 2023-04-26 RX ORDER — OXYTOCIN 10 UNIT/ML
41.67 VIAL (ML) INJECTION
Qty: 20 | Refills: 0 | Status: DISCONTINUED | OUTPATIENT
Start: 2023-04-26 | End: 2023-04-28

## 2023-04-26 RX ORDER — LANOLIN
1 OINTMENT (GRAM) TOPICAL EVERY 6 HOURS
Refills: 0 | Status: DISCONTINUED | OUTPATIENT
Start: 2023-04-26 | End: 2023-04-28

## 2023-04-26 RX ADMIN — Medication 600 MILLIGRAM(S): at 19:26

## 2023-04-26 RX ADMIN — SODIUM CHLORIDE 3 MILLILITER(S): 9 INJECTION INTRAMUSCULAR; INTRAVENOUS; SUBCUTANEOUS at 06:09

## 2023-04-26 RX ADMIN — SODIUM CHLORIDE 3 MILLILITER(S): 9 INJECTION INTRAMUSCULAR; INTRAVENOUS; SUBCUTANEOUS at 22:46

## 2023-04-26 RX ADMIN — Medication 975 MILLIGRAM(S): at 20:19

## 2023-04-26 RX ADMIN — Medication 600 MILLIGRAM(S): at 06:40

## 2023-04-26 RX ADMIN — Medication 600 MILLIGRAM(S): at 23:47

## 2023-04-26 RX ADMIN — Medication 30 MILLIGRAM(S): at 00:36

## 2023-04-26 RX ADMIN — SODIUM CHLORIDE 3 MILLILITER(S): 9 INJECTION INTRAMUSCULAR; INTRAVENOUS; SUBCUTANEOUS at 13:15

## 2023-04-26 RX ADMIN — Medication 200 GRAM(S): at 16:08

## 2023-04-26 RX ADMIN — Medication 600 MILLIGRAM(S): at 06:10

## 2023-04-26 RX ADMIN — Medication 975 MILLIGRAM(S): at 08:08

## 2023-04-26 RX ADMIN — Medication 5 MILLILITER(S): at 18:09

## 2023-04-26 RX ADMIN — Medication 104 MILLIGRAM(S): at 18:06

## 2023-04-26 RX ADMIN — Medication 104 MILLIGRAM(S): at 03:06

## 2023-04-26 RX ADMIN — Medication 975 MILLIGRAM(S): at 21:32

## 2023-04-26 RX ADMIN — Medication 200 GRAM(S): at 02:38

## 2023-04-26 RX ADMIN — Medication 104 MILLIGRAM(S): at 12:13

## 2023-04-26 RX ADMIN — Medication 600 MILLIGRAM(S): at 18:06

## 2023-04-26 RX ADMIN — Medication 200 GRAM(S): at 08:12

## 2023-04-26 RX ADMIN — Medication 200 GRAM(S): at 20:19

## 2023-04-26 RX ADMIN — Medication 5 MILLILITER(S): at 12:10

## 2023-04-26 RX ADMIN — Medication 975 MILLIGRAM(S): at 12:16

## 2023-04-26 RX ADMIN — Medication 600 MILLIGRAM(S): at 12:13

## 2023-04-26 NOTE — CHART NOTE - NSCHARTNOTEFT_GEN_A_CORE
Ob Attg Note;   This note is a summary of event prior to delivery, written now as I was @ patient bedside nearly continuously.   Was Notified of a fever and maternal and fetal tachycardia at 19:45 T was 100.9. At that time , ampicillin, gentamycin and Tylenol 650mg rectally. Patient was examined at 20;20 and was 8-9cm/90%/-2 station.   Fetal heart rate continued to increase to baseline of 200bpm, maternal heart rate was 130's. Pitocin was stopped, and oxygen was given.   At 20:50 fetal heart remained Cat 2 b/c of elevated baseline of 200, variability was moderate, Temperature was 102.8, Iv Tylenol 1gm given.   By 21:50 baseline FHR decreased to the 170-180 range and temperature was 101.7. Pt was fully dilated at 22:38 at 0 station, FHR baseline decreased to 160's.   Patient pushed well,  and had NSD 23:21 w/ pediatrics present.

## 2023-04-26 NOTE — OB PROVIDER DELIVERY SUMMARY - NSPROVIDERDELIVERYNOTE_OBGYN_ALL_OB_FT
Patient was fully dilated and pushed. NSD live female, Apgars 9/9. Vtx delivered  OA, Fetal head restituted to ROT. The anterior and posterior shoulders delivered, followed by the remaining body atraumatically. Pediatrics present in delivery room. Delayed cord clamping was performed, and then clamped and cut. Cord blood & gases collected. The  was handed to mother for skin to skin. The placenta delivered spontaneously intact with 3v cord and sent to pathology. Uterus massaged and firm with oxytocin given IV and Methergine x 1 IM. Cervix, vagina and perineum inspected and intact.  QBL 650cc -, hemostasis assured.

## 2023-04-26 NOTE — OB PROVIDER DELIVERY SUMMARY - NSSELHIDDEN_OBGYN_ALL_OB_FT
[NS_DeliveryAttending1_OBGYN_ALL_OB_FT:[email or web address]=],[NS_DeliveryRN_OBGYN_ALL_OB_FT:[id number]=],[NS_CirculateRN2_OBGYN_ALL_OB_FT:[id number]=]

## 2023-04-27 ENCOUNTER — APPOINTMENT (OUTPATIENT)
Dept: OBGYN | Facility: CLINIC | Age: 33
End: 2023-04-27

## 2023-04-27 LAB
BASOPHILS # BLD AUTO: 0.03 K/UL — SIGNIFICANT CHANGE UP (ref 0–0.2)
BASOPHILS NFR BLD AUTO: 0.1 % — SIGNIFICANT CHANGE UP (ref 0–1)
EOSINOPHIL # BLD AUTO: 0.11 K/UL — SIGNIFICANT CHANGE UP (ref 0–0.7)
EOSINOPHIL NFR BLD AUTO: 0.5 % — SIGNIFICANT CHANGE UP (ref 0–8)
HCT VFR BLD CALC: 26.8 % — LOW (ref 37–47)
HGB BLD-MCNC: 8.8 G/DL — LOW (ref 12–16)
IMM GRANULOCYTES NFR BLD AUTO: 1.7 % — HIGH (ref 0.1–0.3)
LYMPHOCYTES # BLD AUTO: 10.4 % — LOW (ref 20.5–51.1)
LYMPHOCYTES # BLD AUTO: 2.18 K/UL — SIGNIFICANT CHANGE UP (ref 1.2–3.4)
MCHC RBC-ENTMCNC: 28.8 PG — SIGNIFICANT CHANGE UP (ref 27–31)
MCHC RBC-ENTMCNC: 32.8 G/DL — SIGNIFICANT CHANGE UP (ref 32–37)
MCV RBC AUTO: 87.6 FL — SIGNIFICANT CHANGE UP (ref 81–99)
MONOCYTES # BLD AUTO: 1.53 K/UL — HIGH (ref 0.1–0.6)
MONOCYTES NFR BLD AUTO: 7.3 % — SIGNIFICANT CHANGE UP (ref 1.7–9.3)
NEUTROPHILS # BLD AUTO: 16.83 K/UL — HIGH (ref 1.4–6.5)
NEUTROPHILS NFR BLD AUTO: 80 % — HIGH (ref 42.2–75.2)
NRBC # BLD: 0 /100 WBCS — SIGNIFICANT CHANGE UP (ref 0–0)
PLATELET # BLD AUTO: 182 K/UL — SIGNIFICANT CHANGE UP (ref 130–400)
PMV BLD: 12.9 FL — HIGH (ref 7.4–10.4)
RBC # BLD: 3.06 M/UL — LOW (ref 4.2–5.4)
RBC # FLD: 13.7 % — SIGNIFICANT CHANGE UP (ref 11.5–14.5)
WBC # BLD: 21.04 K/UL — HIGH (ref 4.8–10.8)
WBC # FLD AUTO: 21.04 K/UL — HIGH (ref 4.8–10.8)

## 2023-04-27 RX ADMIN — Medication 975 MILLIGRAM(S): at 22:14

## 2023-04-27 RX ADMIN — Medication 600 MILLIGRAM(S): at 06:19

## 2023-04-27 RX ADMIN — Medication 600 MILLIGRAM(S): at 12:32

## 2023-04-27 RX ADMIN — Medication 5 MILLILITER(S): at 18:27

## 2023-04-27 RX ADMIN — Medication 104 MILLIGRAM(S): at 02:31

## 2023-04-27 RX ADMIN — Medication 600 MILLIGRAM(S): at 00:25

## 2023-04-27 RX ADMIN — Medication 200 GRAM(S): at 01:53

## 2023-04-27 RX ADMIN — Medication 5 MILLILITER(S): at 12:32

## 2023-04-27 RX ADMIN — Medication 975 MILLIGRAM(S): at 01:54

## 2023-04-27 RX ADMIN — Medication 975 MILLIGRAM(S): at 23:00

## 2023-04-27 RX ADMIN — Medication 975 MILLIGRAM(S): at 02:59

## 2023-04-27 RX ADMIN — Medication 200 GRAM(S): at 09:35

## 2023-04-27 RX ADMIN — Medication 600 MILLIGRAM(S): at 18:26

## 2023-04-27 RX ADMIN — Medication 600 MILLIGRAM(S): at 23:56

## 2023-04-27 RX ADMIN — Medication 600 MILLIGRAM(S): at 14:14

## 2023-04-27 RX ADMIN — SODIUM CHLORIDE 3 MILLILITER(S): 9 INJECTION INTRAMUSCULAR; INTRAVENOUS; SUBCUTANEOUS at 23:39

## 2023-04-27 RX ADMIN — SODIUM CHLORIDE 3 MILLILITER(S): 9 INJECTION INTRAMUSCULAR; INTRAVENOUS; SUBCUTANEOUS at 14:14

## 2023-04-27 RX ADMIN — SODIUM CHLORIDE 3 MILLILITER(S): 9 INJECTION INTRAMUSCULAR; INTRAVENOUS; SUBCUTANEOUS at 06:00

## 2023-04-27 RX ADMIN — Medication 104 MILLIGRAM(S): at 12:33

## 2023-04-28 ENCOUNTER — TRANSCRIPTION ENCOUNTER (OUTPATIENT)
Age: 33
End: 2023-04-28

## 2023-04-28 VITALS
RESPIRATION RATE: 18 BRPM | DIASTOLIC BLOOD PRESSURE: 59 MMHG | HEART RATE: 83 BPM | SYSTOLIC BLOOD PRESSURE: 120 MMHG | TEMPERATURE: 98 F

## 2023-04-28 LAB
BASOPHILS # BLD AUTO: 0.01 K/UL — SIGNIFICANT CHANGE UP (ref 0–0.2)
BASOPHILS NFR BLD AUTO: 0.2 % — SIGNIFICANT CHANGE UP (ref 0–1)
EOSINOPHIL # BLD AUTO: 0.01 K/UL — SIGNIFICANT CHANGE UP (ref 0–0.7)
EOSINOPHIL NFR BLD AUTO: 0.2 % — SIGNIFICANT CHANGE UP (ref 0–8)
HCT VFR BLD CALC: 25.7 % — LOW (ref 37–47)
HGB BLD-MCNC: 8.3 G/DL — LOW (ref 12–16)
IMM GRANULOCYTES NFR BLD AUTO: 3.4 % — HIGH (ref 0.1–0.3)
LYMPHOCYTES # BLD AUTO: 0.58 K/UL — LOW (ref 1.2–3.4)
LYMPHOCYTES # BLD AUTO: 10.8 % — LOW (ref 20.5–51.1)
MCHC RBC-ENTMCNC: 28.4 PG — SIGNIFICANT CHANGE UP (ref 27–31)
MCHC RBC-ENTMCNC: 32.3 G/DL — SIGNIFICANT CHANGE UP (ref 32–37)
MCV RBC AUTO: 88 FL — SIGNIFICANT CHANGE UP (ref 81–99)
MONOCYTES # BLD AUTO: 0.4 K/UL — SIGNIFICANT CHANGE UP (ref 0.1–0.6)
MONOCYTES NFR BLD AUTO: 7.4 % — SIGNIFICANT CHANGE UP (ref 1.7–9.3)
NEUTROPHILS # BLD AUTO: 4.19 K/UL — SIGNIFICANT CHANGE UP (ref 1.4–6.5)
NEUTROPHILS NFR BLD AUTO: 78 % — HIGH (ref 42.2–75.2)
NRBC # BLD: 0 /100 WBCS — SIGNIFICANT CHANGE UP (ref 0–0)
PLATELET # BLD AUTO: 159 K/UL — SIGNIFICANT CHANGE UP (ref 130–400)
PMV BLD: 12.2 FL — HIGH (ref 7.4–10.4)
RBC # BLD: 2.92 M/UL — LOW (ref 4.2–5.4)
RBC # FLD: 13.5 % — SIGNIFICANT CHANGE UP (ref 11.5–14.5)
SURGICAL PATHOLOGY STUDY: SIGNIFICANT CHANGE UP
WBC # BLD: 5.37 K/UL — SIGNIFICANT CHANGE UP (ref 4.8–10.8)
WBC # FLD AUTO: 5.37 K/UL — SIGNIFICANT CHANGE UP (ref 4.8–10.8)

## 2023-04-28 PROCEDURE — 71046 X-RAY EXAM CHEST 2 VIEWS: CPT | Mod: 26

## 2023-04-28 RX ORDER — ACETAMINOPHEN 500 MG
3 TABLET ORAL
Qty: 0 | Refills: 0 | DISCHARGE
Start: 2023-04-28

## 2023-04-28 RX ORDER — IRON SUCROSE 20 MG/ML
200 INJECTION, SOLUTION INTRAVENOUS ONCE
Refills: 0 | Status: COMPLETED | OUTPATIENT
Start: 2023-04-28 | End: 2023-04-28

## 2023-04-28 RX ORDER — IBUPROFEN 200 MG
1 TABLET ORAL
Qty: 0 | Refills: 0 | DISCHARGE
Start: 2023-04-28

## 2023-04-28 RX ORDER — SIMETHICONE 80 MG/1
1 TABLET, CHEWABLE ORAL
Qty: 0 | Refills: 0 | DISCHARGE
Start: 2023-04-28

## 2023-04-28 RX ORDER — AER TRAVELER 0.5 G/1
1 SOLUTION RECTAL; TOPICAL
Qty: 0 | Refills: 0 | DISCHARGE
Start: 2023-04-28

## 2023-04-28 RX ORDER — BENZOCAINE 10 %
1 GEL (GRAM) MUCOUS MEMBRANE
Qty: 0 | Refills: 0 | DISCHARGE
Start: 2023-04-28

## 2023-04-28 RX ADMIN — Medication 975 MILLIGRAM(S): at 16:45

## 2023-04-28 RX ADMIN — Medication 1 TABLET(S): at 11:14

## 2023-04-28 RX ADMIN — Medication 975 MILLIGRAM(S): at 16:02

## 2023-04-28 RX ADMIN — Medication 600 MILLIGRAM(S): at 00:30

## 2023-04-28 RX ADMIN — SODIUM CHLORIDE 3 MILLILITER(S): 9 INJECTION INTRAMUSCULAR; INTRAVENOUS; SUBCUTANEOUS at 06:19

## 2023-04-28 RX ADMIN — Medication 600 MILLIGRAM(S): at 11:14

## 2023-04-28 RX ADMIN — IRON SUCROSE 110 MILLIGRAM(S): 20 INJECTION, SOLUTION INTRAVENOUS at 09:11

## 2023-04-28 RX ADMIN — Medication 5 MILLILITER(S): at 05:14

## 2023-04-28 RX ADMIN — Medication 600 MILLIGRAM(S): at 12:00

## 2023-04-28 RX ADMIN — Medication 600 MILLIGRAM(S): at 18:54

## 2023-04-28 RX ADMIN — Medication 5 MILLILITER(S): at 11:14

## 2023-04-28 RX ADMIN — Medication 600 MILLIGRAM(S): at 06:21

## 2023-04-28 RX ADMIN — SODIUM CHLORIDE 3 MILLILITER(S): 9 INJECTION INTRAMUSCULAR; INTRAVENOUS; SUBCUTANEOUS at 13:15

## 2023-04-28 RX ADMIN — Medication 975 MILLIGRAM(S): at 08:34

## 2023-04-28 RX ADMIN — Medication 975 MILLIGRAM(S): at 09:30

## 2023-04-28 NOTE — DISCHARGE NOTE OB - CARE PROVIDER_API CALL
Mika Reeves)  Obstetrics and Gynecology  [address] [place] [address]  Phone: [phone]  Fax: [phone]  Follow Up Time:

## 2023-04-28 NOTE — DISCHARGE NOTE OB - MEDICATION SUMMARY - MEDICATIONS TO TAKE
I will START or STAY ON the medications listed below when I get home from the hospital:    ibuprofen 600 mg oral tablet  -- 1 tab(s) by mouth every 6 hours  -- Indication: For pain    acetaminophen 325 mg oral tablet  -- 3 tab(s) by mouth every 6 hours  -- Indication: For pain    benzocaine 20% topical spray  -- 1 Apply on skin to affected area every 6 hours As needed for Perineal discomfort  -- Indication: For perineal discomfort    witch hazel 50% topical pad  -- 1 Apply on skin to affected area every 4 hours As needed Perineal discomfort  -- Indication: For perineal discomfort    Prenatal Multivitamins with Folic Acid 1 mg oral tablet  -- 1 tab(s) by mouth once a day  -- Indication: For Healthy mother    simethicone 80 mg oral tablet, chewable  -- 1 tab(s) by mouth every 4 hours As needed Gas  -- Indication: For Gas pain

## 2023-04-28 NOTE — DISCHARGE NOTE OB - PATIENT PORTAL LINK FT
You can access the FollowMyHealth Patient Portal offered by [facility] by registering at the following website: [email or web address]. By joining [facility]’s FollowMyHealth portal, you will also be able to view your health information using other applications (apps) compatible with our system.

## 2023-04-28 NOTE — PROGRESS NOTE ADULT - SUBJECTIVE AND OBJECTIVE BOX
OB attending  PPD #1    Pt doing well, pain well controlled. No overnight events, no acute complaints.    Ambulating: Yes  Voiding: Yes  Flatus: Yes  Bowel movements: Yes   Breast or bottle feeding: Breastfeeding  Diet: Regular    PAST MEDICAL & SURGICAL HISTORY:  No pertinent past medical history      HSV infection      Gestational diabetes      S/P laparoscopic cholecystectomy      H/O dilation and curettage          Physical Exam  Vital Signs Last 24 Hrs  T(C): 36.6 (2023 08:02), Max: 39.3 (2023 20:50)  T(F): 97.8 (2023 08:02), Max: 102.74 (2023 20:50)  HR: 88 (2023 08:02) (66 - 151)  BP: 119/64 (2023 08:02) (89/51 - 139/61)  RR: 18 (2023 08:02) (18 - 18)  SpO2: 98% (2023 23:19) (81% - 100%)    Parameters below as of 2023 01:35  Patient On (Oxygen Delivery Method): room air      Gen: AAOx3, NAD  Abd: Soft, nontender, nondistended, BS+  Fundus: Firm, below umbilicus  Lochia: normal  Ext: No calf tenderness, no swelling    Labs:                        10.7   7.76  )-----------( 193      ( 2023 06:27 )             32.0     Rh+    A/P:  s/p , PPD #1, s/p IOL , for uncontrolled GDM , chorioamnionitis in labor, T max 102.7, now afebrile, on Amp/Gent doing well  - continue current management  -continue IV abx till afebrile at least 24 hr  -check cbc
PGY 4 Note    Patient seen at bedside for evaluation of labor progression. Comfortable s/p epidural.  No other complaints.    Vital Signs Last 24 Hrs  T(C): 36.8 (2023 18:51), Max: 36.8 (2023 18:51)  T(F): 98.24 (2023 18:51), Max: 98.24 (2023 18:51)  HR: 89 (2023 18:55) (64 - 104)  BP: 123/62 (2023 18:50) (89/51 - 139/61)  RR: 16 (2023 05:00) (16 - 16)  SpO2: 100% (2023 19:00) (92% - 100%)    Parameters below as of 2023 05:00  Patient On (Oxygen Delivery Method): room air    EFM: 140/mod/+accel, cat I  TOCO: q2-5m  SVE: 7/80/-2 @1831 per Dr. Downey    Labs:                        10.7   7.76  )-----------( 193      ( 2023 06:27 )             32.0           ABO RH Interpretation: B POS (23 @ 07:40)    Urinalysis Basic - ( 2023 05:20 )    Color: Light Yellow / Appearance: Clear / S.008 / pH: x  Gluc: x / Ketone: Negative  / Bili: Negative / Urobili: <2 mg/dL   Blood: x / Protein: Negative / Nitrite: Negative   Leuk Esterase: Negative / RBC: x / WBC x   Sq Epi: x / Non Sq Epi: x / Bacteria: x    RPR NR  UDS neg    Meds: chlorhexidine 2% Cloths 1 Application(s) Topical once  citric acid/sodium citrate Solution 15 milliLiter(s) Oral every 6 hours  fentanyl (2 MICROgram(s)/mL) + bupivacaine 0.0625%  in 0.9% Sodium Chloride PCEA 250 milliLiter(s) Epidural PCA Continuous, started @0800  lactated ringers. 1000 milliLiter(s) IV Continuous <Continuous>  oxytocin Infusion 333.333 milliUNIT(s)/Min IV Continuous <Continuous>  oxytocin Infusion. 2 milliUNIT(s)/Min IV Continuous <Continuous>, started @0615, now at 18mu/min      
PGY4 Note    Patient seen at bedside for evaluation of labor progression, doing well, no complaints. Epidural in place, pain well-controlled. AROM performed, patient tolerated well. Clear amniotic fluid noted.    Vital Signs Last 24 Hrs  T(C): 36.5 (2023 05:00), Max: 36.5 (2023 05:00)  T(F): 97.7 (2023 05:00), Max: 97.7 (2023 05:00)  HR: 78 (2023 12:45) (64 - 99)  BP: 110/57 (2023 12:35) (98/52 - 139/61)  RR: 16 (2023 05:00) (16 - 16)  SpO2: 99% (2023 12:45) (93% - 100%)    Parameters below as of 2023 05:00  Patient On (Oxygen Delivery Method): room air    EFM: 120/mod petty/+accels  TOCO: q2-4 mins  SVE: 3/60/-2 (AROM by Dr. Downey @1231)    Medications:  Pitocin started @0615  Epidural started @0800      Labs:                        10.7   7.76  )-----------( 193      ( 2023 06:27 )             32.0           ABO RH Interpretation: B POS (23 @ 07:40)    Antibody Screen: NEG (23 @ 07:40)    Urinalysis Basic - ( 2023 05:20 )    Color: Light Yellow / Appearance: Clear / S.008 / pH: x  Gluc: x / Ketone: Negative  / Bili: Negative / Urobili: <2 mg/dL   Blood: x / Protein: Negative / Nitrite: Negative   Leuk Esterase: Negative / RBC: x / WBC x   Sq Epi: x / Non Sq Epi: x / Bacteria: x      L&amp;D Drug Screen, Urine: Done (23 @ 05:20)    Prenatal Syphilis Test: Nonreact (23 @ 05:20)              A/P:   33 yo  @ 38w1d, h/o HSV1, no prodromal symptoms or lesions currently, polyhydramnios, IOL for poorly controlled GDMA2, on pitocin, s/p epidural and AROM, doing well    -continue pitocin  -pain management prn  -cont efm/toco  -f/u pending labs - UDS  -cont to monitor vitals  -cont iv hydration    Dr. Moss bedside  
Patient seen resting comfortably. No acute events overnight, no current complaints. Pain controlled with motrin. She reports lochia as minimal, no fevers, chills, nausea, vomiting, SOB, palpitations, dizziness. Patient is ambulating, tolerating diet, voiding freely without issue. Breastfeeding without difficulty.    Pt afebrile >24h s/p antibiotics for almost 48h. WBC downtrended 27 > 21 with 80% shift however shift also downtrending. Pt is completely asymptomatic no currently complaints. Afebrile, no tachycardia, denies SOB, pain, palpitaitons, abdominal pain, foul smelling lochia, LE pain/swelling    Exam:  Gen: AAOx3  Breast: no engorgement, erythema, or tenderness  Abd: soft, non-tender, non-distended, uterus firm and to umbilicus  Ext: non-tender LE bilaterally    A: 22yo  s/p NSD PPD#2, stable    Plan  - continue routine pp care  - encourage ambulating and breastfeeding  - motrin for pain  - d/c abx  - CBC in AM   
Patient seen resting comfortably. No acute events overnight, no current complaints. Pain controlled with motrin. She reports lochia as minimal, no fevers, chills, nausea, vomiting, SOB, palpitations, dizziness. Patient is ambulating, tolerating diet, voiding freely without issue. Breastfeeding without difficulty. no anemic sx    Pt remains afebrile now 48h from intrauterine infection. WBC yesterday 21 downtrended now to 5. Pt copmletely asymptomatic.    Exam:  Gen: AAOx3  Breast: no engorgement, erythema, or tenderness  Abd: soft, non-tender, non-distended, uterus firm and to umbilicus  Ext: non-tender LE bilaterally    A: s/p NSD PPD#3, stable    Plan  - continue routine pp care  - encourage ambulating and breastfeeding  - motrin for pain  - discharge with routine f/u  - fe for acute blood loss anemia

## 2023-04-28 NOTE — DISCHARGE NOTE OB - NS MD DC FALL RISK RISK
For information on Fall & Injury Prevention, visit: [email or web address] OR  [email or web address] OR  https://www.cdc.gov/steadi/patient.html

## 2023-04-29 LAB
CULTURE RESULTS: SIGNIFICANT CHANGE UP
SPECIMEN SOURCE: SIGNIFICANT CHANGE UP

## 2023-05-02 DIAGNOSIS — O41.1230 CHORIOAMNIONITIS, THIRD TRIMESTER, NOT APPLICABLE OR UNSPECIFIED: ICD-10-CM

## 2023-05-02 DIAGNOSIS — Z28.09 IMMUNIZATION NOT CARRIED OUT BECAUSE OF OTHER CONTRAINDICATION: ICD-10-CM

## 2023-05-02 DIAGNOSIS — O40.3XX0 POLYHYDRAMNIOS, THIRD TRIMESTER, NOT APPLICABLE OR UNSPECIFIED: ICD-10-CM

## 2023-05-02 DIAGNOSIS — Z90.49 ACQUIRED ABSENCE OF OTHER SPECIFIED PARTS OF DIGESTIVE TRACT: ICD-10-CM

## 2023-05-02 DIAGNOSIS — Z20.822 CONTACT WITH AND (SUSPECTED) EXPOSURE TO COVID-19: ICD-10-CM

## 2023-05-02 DIAGNOSIS — Z3A.38 38 WEEKS GESTATION OF PREGNANCY: ICD-10-CM

## 2023-05-02 DIAGNOSIS — A60.9 ANOGENITAL HERPESVIRAL INFECTION, UNSPECIFIED: ICD-10-CM

## 2023-05-03 ENCOUNTER — APPOINTMENT (OUTPATIENT)
Dept: ANTEPARTUM | Facility: CLINIC | Age: 33
End: 2023-05-03

## 2023-05-10 ENCOUNTER — APPOINTMENT (OUTPATIENT)
Dept: ANTEPARTUM | Facility: CLINIC | Age: 33
End: 2023-05-10

## 2023-05-17 ENCOUNTER — APPOINTMENT (OUTPATIENT)
Dept: ANTEPARTUM | Facility: CLINIC | Age: 33
End: 2023-05-17

## 2023-05-18 ENCOUNTER — NON-APPOINTMENT (OUTPATIENT)
Age: 33
End: 2023-05-18

## 2023-06-05 PROBLEM — O24.419 GESTATIONAL DIABETES MELLITUS IN PREGNANCY, UNSPECIFIED CONTROL: Chronic | Status: ACTIVE | Noted: 2023-04-25

## 2023-06-05 PROBLEM — B00.9 HERPESVIRAL INFECTION, UNSPECIFIED: Chronic | Status: ACTIVE | Noted: 2023-04-25

## 2023-06-07 ENCOUNTER — APPOINTMENT (OUTPATIENT)
Dept: OBGYN | Facility: CLINIC | Age: 33
End: 2023-06-07
Payer: COMMERCIAL

## 2023-06-07 VITALS
HEIGHT: 63 IN | SYSTOLIC BLOOD PRESSURE: 102 MMHG | DIASTOLIC BLOOD PRESSURE: 69 MMHG | WEIGHT: 189 LBS | BODY MASS INDEX: 33.49 KG/M2

## 2023-06-07 PROCEDURE — 0503F POSTPARTUM CARE VISIT: CPT

## 2023-06-07 PROCEDURE — 99212 OFFICE O/P EST SF 10 MIN: CPT

## 2023-06-07 RX ORDER — PAROXETINE HYDROCHLORIDE 20 MG/1
20 TABLET, FILM COATED ORAL DAILY
Qty: 30 | Refills: 1 | Status: ACTIVE | COMMUNITY
Start: 2023-06-07 | End: 1900-01-01

## 2023-06-07 NOTE — HISTORY OF PRESENT ILLNESS
[Postpartum Follow Up] : postpartum follow up [Complications:___] : no complications [] : delivered by vaginal delivery [Female] : Delivery History: baby girl [Wt. ___] : weighing [unfilled] [Breastfeeding] : not currently nursing [S/Sx PP Depression] : no signs/symptoms of postpartum depression [Erythema] : not erythematous [Back to Normal] : is back to normal in size [Mild] : mild vaginal bleeding [Normal] : the vagina was normal [Cervix Sample Taken] : cervical sample not taken for a Pap smear [Examination Of The Breasts] : breasts are normal [Doing Well] : is doing well [No Sign of Infection] : is showing no signs of infection [Excellent Pain Control] : has excellent pain control [None] : None [FreeTextEntry8] : FEELS SIGNS OF PP DPERESSION , THOUGHTS OF HARMING SELF OR OTHERS, TAKES CARE OF A BABY AND HER 3 YR OLD  [de-identified] : NOT BREASTFEEDING  [de-identified] : LACK OF DESIRE, LACK OF INITIATIVE, POOR APPETITE, DIFFICULTY SLEEPING  [de-identified] : NORMAL PHYSICAL EXAM ,PP DEPRESSION  [de-identified] : GC, CHL SENT FROM THE OFFIC E, DICUSSED PRMACOLOGICAL TREATEMNT OF PP DEPRESSION , DISCUSSED THERPAY, PREAUTIONS EXPLAINED , PT TO CALL OFFICE IN 1 WEEK FOR A PROGRESS REPORT AFTER STARTING PAXIL

## 2023-06-08 LAB
C TRACH RRNA SPEC QL NAA+PROBE: NOT DETECTED
N GONORRHOEA RRNA SPEC QL NAA+PROBE: NOT DETECTED
SOURCE AMPLIFICATION: NORMAL

## 2023-06-19 RX ORDER — PAROXETINE HYDROCHLORIDE 40 MG/1
40 TABLET, FILM COATED ORAL DAILY
Qty: 30 | Refills: 1 | Status: ACTIVE | COMMUNITY
Start: 2023-06-19 | End: 1900-01-01

## 2023-07-11 ENCOUNTER — NON-APPOINTMENT (OUTPATIENT)
Age: 33
End: 2023-07-11

## 2023-07-24 ENCOUNTER — APPOINTMENT (OUTPATIENT)
Dept: OBGYN | Facility: CLINIC | Age: 33
End: 2023-07-24

## 2023-08-09 DIAGNOSIS — Z86.32 PERSONAL HISTORY OF GESTATIONAL DIABETES: ICD-10-CM

## 2023-08-09 DIAGNOSIS — Z87.59 PERSONAL HISTORY OF OTHER COMPLICATIONS OF PREGNANCY, CHILDBIRTH AND THE PUERPERIUM: ICD-10-CM

## 2023-08-09 DIAGNOSIS — Z32.01 ENCOUNTER FOR PREGNANCY TEST, RESULT POSITIVE: ICD-10-CM

## 2023-08-09 DIAGNOSIS — O21.9 VOMITING OF PREGNANCY, UNSPECIFIED: ICD-10-CM

## 2023-08-09 RX ORDER — PAROXETINE HYDROCHLORIDE 10 MG/1
10 TABLET, FILM COATED ORAL DAILY
Qty: 30 | Refills: 1 | Status: ACTIVE | COMMUNITY
Start: 2023-08-09 | End: 1900-01-01

## 2023-08-09 RX ORDER — PAROXETINE HYDROCHLORIDE 40 MG/1
40 TABLET, FILM COATED ORAL DAILY
Qty: 30 | Refills: 1 | Status: ACTIVE | COMMUNITY
Start: 2023-08-09 | End: 1900-01-01

## 2024-03-08 NOTE — OB PROVIDER H&P - IF RESULT GREATER THAN 35 DAYS OLD SELECT STATUS
This RN called [facility] at [phone] and spoke with Keena regarding Katerzia denial. Keena stated that if the Norliqva/Katerzia prescription is ran for 150ml/30 days that it shows it should be covered and able to be filled by the patient's pharmacy since the entire bottle needs to be dispensed in order to be filled. This RN will call pharmacy.    1215: Called and spoke with pharmacist who states med cannot be ran as 150ml/30 day due to this being as if they were getting 3 free months of the prescription. States the best option would be to send the amlodipine compound to the pharmacy at [facility] (0.625 mg or 1.25 mg suspensions are the doses that would need to be configured based on patient's weight), or to appeal the denial for katerzia.  
N/A

## 2024-05-01 NOTE — OB RN TRIAGE NOTE - NS_DISCHARGEPRINT_OBGYN_ALL_OB
Detail Level: Detailed Depth Of Biopsy: dermis Was A Bandage Applied: Yes Size Of Lesion In Cm: 0 Biopsy Type: H and E Biopsy Method: Dermablade Anesthesia Type: 1% lidocaine with epinephrine Anesthesia Volume In Cc: 0.5 Hemostasis: Aluminum Chloride Wound Care: Vaseline Dressing: Band-Aid Destruction After The Procedure: No Type Of Destruction Used: Curettage Curettage Text: The wound bed was treated with curettage after the biopsy was performed. Cryotherapy Text: The wound bed was treated with cryotherapy after the biopsy was performed. Electrodesiccation Text: The wound bed was treated with electrodesiccation after the biopsy was performed. Electrodesiccation And Curettage Text: The wound bed was treated with electrodesiccation and curettage after the biopsy was performed. Silver Nitrate Text: The wound bed was treated with silver nitrate after the biopsy was performed. Lab: 6 Lab Facility: 3 Consent: Written consent was obtained and risks were reviewed including but not limited to scarring, infection, bleeding, scabbing, incomplete removal, nerve damage and allergy to anesthesia. Post-Care Instructions: I reviewed with the patient in detail post-care instructions. Patient is to keep the biopsy site dry overnight, and then apply bacitracin twice daily until healed. Patient may apply hydrogen peroxide soaks to remove any crusting. Notification Instructions: Patient will be notified of biopsy results. However, patient instructed to call the office if not contacted within 2 weeks. Billing Type: Third-Party Bill Information: Selecting Yes will display possible errors in your note based on the variables you have selected. This validation is only offered as a suggestion for you. PLEASE NOTE THAT THE VALIDATION TEXT WILL BE REMOVED WHEN YOU FINALIZE YOUR NOTE. IF YOU WANT TO FAX A PRELIMINARY NOTE YOU WILL NEED TO TOGGLE THIS TO 'NO' IF YOU DO NOT WANT IT IN YOUR FAXED NOTE.  OB Discharge Instructions

## 2024-06-24 ENCOUNTER — APPOINTMENT (OUTPATIENT)
Dept: OBGYN | Facility: CLINIC | Age: 34
End: 2024-06-24

## 2024-07-30 ENCOUNTER — APPOINTMENT (OUTPATIENT)
Dept: OBGYN | Facility: CLINIC | Age: 34
End: 2024-07-30
Payer: MEDICAID

## 2024-07-30 VITALS
WEIGHT: 150 LBS | BODY MASS INDEX: 26.58 KG/M2 | HEIGHT: 63 IN | SYSTOLIC BLOOD PRESSURE: 109 MMHG | DIASTOLIC BLOOD PRESSURE: 70 MMHG

## 2024-07-30 DIAGNOSIS — Z01.419 ENCOUNTER FOR GYNECOLOGICAL EXAMINATION (GENERAL) (ROUTINE) W/OUT ABNORMAL FINDINGS: ICD-10-CM

## 2024-07-30 PROCEDURE — 99459 PELVIC EXAMINATION: CPT

## 2024-07-30 PROCEDURE — 81003 URINALYSIS AUTO W/O SCOPE: CPT | Mod: QW

## 2024-07-30 PROCEDURE — 99395 PREV VISIT EST AGE 18-39: CPT | Mod: 25

## 2024-07-30 PROCEDURE — 81025 URINE PREGNANCY TEST: CPT

## 2024-07-30 NOTE — HISTORY OF PRESENT ILLNESS
[FreeTextEntry1] : Pt is a , LMP  7/18/24, Pt here for annual exam no abnormal vaginal bleeding, pelvic pain or vaginal discharge.  Ob / Gyn Hx-NSD x21 , Vtop x1, Hx Lgsil, + hpv , last pap 2/22 Normal no Hpv, Condoms for BC   Med/Surg Hx - L/s cholecystectomy 2010  No Meds  Nkda  [Patient reported PAP Smear was normal] : Patient reported PAP Smear was normal [PapSmeardate] : 2022

## 2024-07-30 NOTE — PHYSICAL EXAM
[Chaperone Present] : A chaperone was present in the examining room during all aspects of the physical examination [[id number]] : A chaperone was present during the pelvic exam. [FreeTextEntry2] : Lavern [Appropriately responsive] : appropriately responsive [Alert] : alert [No Acute Distress] : no acute distress [No Lymphadenopathy] : no lymphadenopathy [Soft] : soft [Non-tender] : non-tender [Non-distended] : non-distended [No HSM] : No HSM [No Lesions] : no lesions [No Mass] : no mass [Oriented x3] : oriented x3 [Examination Of The Breasts] : a normal appearance [No Masses] : no breast masses were palpable [Labia Majora] : normal [Labia Minora] : normal [Normal] : normal [Uterine Adnexae] : normal

## 2024-07-30 NOTE — PLAN
[FreeTextEntry1] : Normal Exam  -Pap due 2025 -Breast self exam reviewed  -Gc Ct sent  -discussed other BC options, wants to continue condom use  -return visit PRN or 1 year

## 2024-07-31 LAB
BILIRUB UR QL STRIP: NORMAL
GLUCOSE UR-MCNC: NORMAL
HCG UR QL: 0.2 EU/DL
HCG UR QL: NEGATIVE
HGB UR QL STRIP.AUTO: NORMAL
KETONES UR-MCNC: NORMAL
LEUKOCYTE ESTERASE UR QL STRIP: NORMAL
NITRITE UR QL STRIP: NORMAL
PH UR STRIP: 6.5
PROT UR STRIP-MCNC: NORMAL
SP GR UR STRIP: 1.01

## 2024-12-19 NOTE — OB RN DELIVERY SUMMARY - NS_RESUSCITMEDS_OBGYN_ALL_OB
Patient overdue for boniva injection. Last administered 8/29/24.    Please sign orders and therapy plan if patient is to continue.    Will then call patient to remind her to schedule at [facility].   No

## 2025-01-10 NOTE — DISCHARGE NOTE OB - BREAST MILK SUPPORTS STABLE NEWBORN BLOOD SUGAR
Goal Outcome Evaluation:            Pt A/Ox4, RA, up with SBA. Ileostomy has a very small amount of brown/serosanguineous liquid output. No gas. Potassium replaced per protocol. Pt tolerating clear liquid diet, vomit once after holding oral potassium in his mouth for too long. PRN Zofran given x1, plan of care ongoing, VSS.                                  Statement Selected

## 2025-08-12 ENCOUNTER — LABORATORY RESULT (OUTPATIENT)
Age: 35
End: 2025-08-12

## 2025-08-12 ENCOUNTER — APPOINTMENT (OUTPATIENT)
Dept: OBGYN | Facility: CLINIC | Age: 35
End: 2025-08-12

## 2025-08-12 ENCOUNTER — NON-APPOINTMENT (OUTPATIENT)
Age: 35
End: 2025-08-12

## 2025-08-12 VITALS
BODY MASS INDEX: 28 KG/M2 | HEIGHT: 63 IN | SYSTOLIC BLOOD PRESSURE: 116 MMHG | WEIGHT: 158 LBS | DIASTOLIC BLOOD PRESSURE: 63 MMHG | HEART RATE: 69 BPM

## 2025-08-12 DIAGNOSIS — N84.1 POLYP OF CERVIX UTERI: ICD-10-CM

## 2025-08-12 DIAGNOSIS — Z01.419 ENCOUNTER FOR GYNECOLOGICAL EXAMINATION (GENERAL) (ROUTINE) W/OUT ABNORMAL FINDINGS: ICD-10-CM

## 2025-08-12 DIAGNOSIS — Z00.00 ENCOUNTER FOR GENERAL ADULT MEDICAL EXAMINATION W/OUT ABNORMAL FINDINGS: ICD-10-CM

## 2025-08-12 DIAGNOSIS — Z32.01 ENCOUNTER FOR PREGNANCY TEST, RESULT POSITIVE: ICD-10-CM

## 2025-08-12 LAB
BILIRUB UR QL STRIP: NORMAL
GLUCOSE UR-MCNC: NORMAL
HCG UR QL: 0.2 EU/DL
HCG UR QL: POSITIVE
HGB UR QL STRIP.AUTO: NORMAL
KETONES UR-MCNC: NORMAL
LEUKOCYTE ESTERASE UR QL STRIP: NORMAL
NITRITE UR QL STRIP: NORMAL
PH UR STRIP: 6
PROT UR STRIP-MCNC: NORMAL
QUALITY CONTROL: YES
SP GR UR STRIP: 1.02

## 2025-08-12 PROCEDURE — 99213 OFFICE O/P EST LOW 20 MIN: CPT | Mod: 25

## 2025-08-12 PROCEDURE — 81025 URINE PREGNANCY TEST: CPT

## 2025-08-12 PROCEDURE — 76817 TRANSVAGINAL US OBSTETRIC: CPT

## 2025-08-12 PROCEDURE — 99395 PREV VISIT EST AGE 18-39: CPT | Mod: 25

## 2025-08-12 PROCEDURE — 99459 PELVIC EXAMINATION: CPT

## 2025-08-12 PROCEDURE — 57500 BIOPSY OF CERVIX: CPT

## 2025-08-12 PROCEDURE — 81003 URINALYSIS AUTO W/O SCOPE: CPT | Mod: QW

## 2025-08-12 RX ORDER — SODIUM ASCORBATE, CHOLECALCIFEROL, DI-ALPHA-TOCOPHERYL ACETATE, THIAMINE MONONITRATE, RIBOFLAVIN, NIACINAMIDE, PYRIDOXINE HCL, FOLIC ACID, CYANOCOBALAMIN, CALCIUM FORMATE, CALCIUM CARBONATE, FERROUS (II) BIS-GLYCINATE CHELATE, POTASSIUM IODIDE, ZINC OXIDE, CHOLINE BITARTRATE, WITH DOCONEXENT AND ICOSAPENT 32-1-230MG
32-1 KIT ORAL
Qty: 30 | Refills: 11 | Status: ACTIVE | COMMUNITY
Start: 2025-08-12 | End: 1900-01-01

## 2025-08-13 LAB
ABORH: NORMAL
ANTIBODY SCREEN: NORMAL
BASOPHILS # BLD AUTO: 0.03 K/UL
BASOPHILS NFR BLD AUTO: 0.3 %
EOSINOPHIL # BLD AUTO: 0.12 K/UL
EOSINOPHIL NFR BLD AUTO: 1.3 %
HCT VFR BLD CALC: 39.6 %
HGB BLD-MCNC: 13 G/DL
IMM GRANULOCYTES NFR BLD AUTO: 0.2 %
LYMPHOCYTES # BLD AUTO: 2.5 K/UL
LYMPHOCYTES NFR BLD AUTO: 26.9 %
MAN DIFF?: NORMAL
MCHC RBC-ENTMCNC: 30.4 PG
MCHC RBC-ENTMCNC: 32.8 G/DL
MCV RBC AUTO: 92.5 FL
MONOCYTES # BLD AUTO: 0.52 K/UL
MONOCYTES NFR BLD AUTO: 5.6 %
NEUTROPHILS # BLD AUTO: 6.1 K/UL
NEUTROPHILS NFR BLD AUTO: 65.7 %
PLATELET # BLD AUTO: 255 K/UL
RBC # BLD: 4.28 M/UL
RBC # FLD: 13.3 %
WBC # FLD AUTO: 9.29 K/UL

## 2025-08-18 LAB
HIV1+2 AB SPEC QL IA.RAPID: NONREACTIVE
LEAD BLD-MCNC: <1 UG/DL
MEV IGG FLD QL IA: >300 AU/ML
MEV IGG+IGM SER-IMP: POSITIVE
MUV AB SER-ACNC: POSITIVE
MUV IGG SER QL IA: >300 AU/ML
RUBV IGG FLD-ACNC: 1.78 INDEX
RUBV IGG SER-IMP: POSITIVE
T PALLIDUM AB SER QL IA: NEGATIVE
VZV AB TITR SER: POSITIVE
VZV IGG SER IF-ACNC: 1.6 S/CO

## 2025-08-21 ENCOUNTER — APPOINTMENT (OUTPATIENT)
Dept: OBGYN | Facility: CLINIC | Age: 35
End: 2025-08-21
Payer: MEDICAID

## 2025-08-21 VITALS
SYSTOLIC BLOOD PRESSURE: 105 MMHG | DIASTOLIC BLOOD PRESSURE: 67 MMHG | WEIGHT: 159 LBS | BODY MASS INDEX: 28.17 KG/M2 | HEART RATE: 67 BPM

## 2025-08-21 DIAGNOSIS — O03.9 COMPLETE OR UNSPECIFIED SPONTANEOUS ABORTION W/OUT COMPLICATION: ICD-10-CM

## 2025-08-21 DIAGNOSIS — O20.0 THREATENED ABORTION: ICD-10-CM

## 2025-08-21 PROCEDURE — 99212 OFFICE O/P EST SF 10 MIN: CPT | Mod: TH,25

## 2025-08-21 PROCEDURE — 99459 PELVIC EXAMINATION: CPT

## 2025-08-21 PROCEDURE — 81003 URINALYSIS AUTO W/O SCOPE: CPT | Mod: QW

## 2025-08-21 PROCEDURE — 76817 TRANSVAGINAL US OBSTETRIC: CPT

## 2025-08-21 RX ORDER — MISOPROSTOL 200 UG/1
200 TABLET ORAL
Qty: 2 | Refills: 0 | Status: ACTIVE | COMMUNITY
Start: 2025-08-21 | End: 1900-01-01

## 2025-08-22 ENCOUNTER — RESULT REVIEW (OUTPATIENT)
Age: 35
End: 2025-08-22

## 2025-08-22 ENCOUNTER — TRANSCRIPTION ENCOUNTER (OUTPATIENT)
Age: 35
End: 2025-08-22

## 2025-08-22 ENCOUNTER — OUTPATIENT (OUTPATIENT)
Dept: OUTPATIENT SERVICES | Facility: HOSPITAL | Age: 35
LOS: 1 days | Discharge: HOME | End: 2025-08-22
Payer: MEDICAID

## 2025-08-22 VITALS
TEMPERATURE: 97 F | SYSTOLIC BLOOD PRESSURE: 105 MMHG | OXYGEN SATURATION: 100 % | DIASTOLIC BLOOD PRESSURE: 61 MMHG | RESPIRATION RATE: 20 BRPM | HEART RATE: 62 BPM

## 2025-08-22 VITALS
TEMPERATURE: 98 F | DIASTOLIC BLOOD PRESSURE: 70 MMHG | RESPIRATION RATE: 20 BRPM | HEIGHT: 62.6 IN | HEART RATE: 63 BPM | OXYGEN SATURATION: 100 % | WEIGHT: 156.09 LBS | SYSTOLIC BLOOD PRESSURE: 108 MMHG

## 2025-08-22 DIAGNOSIS — Z02.9 ENCOUNTER FOR ADMINISTRATIVE EXAMINATIONS, UNSPECIFIED: ICD-10-CM

## 2025-08-22 DIAGNOSIS — Z90.49 ACQUIRED ABSENCE OF OTHER SPECIFIED PARTS OF DIGESTIVE TRACT: Chronic | ICD-10-CM

## 2025-08-22 DIAGNOSIS — Z98.890 OTHER SPECIFIED POSTPROCEDURAL STATES: Chronic | ICD-10-CM

## 2025-08-22 LAB
B19V IGG SER QL IA: 4.7 INDEX
B19V IGG+IGM SER-IMP: NORMAL
B19V IGG+IGM SER-IMP: POSITIVE
B19V IGM FLD-ACNC: 0.11 INDEX
B19V IGM SER-ACNC: NEGATIVE
BACTERIA UR CULT: NORMAL
BILIRUB UR QL STRIP: NORMAL
C TRACH RRNA SPEC QL NAA+PROBE: NOT DETECTED
CYTOLOGY CVX/VAG DOC THIN PREP: NORMAL
GLUCOSE UR-MCNC: NORMAL
HBV SURFACE AG SER QL: NONREACTIVE
HCG UR QL: 0.2 EU/DL
HCV AB SER QL: NONREACTIVE
HCV S/CO RATIO: 0.13 S/CO
HGB UR QL STRIP.AUTO: NORMAL
HPV HIGH+LOW RISK DNA PNL CVX: DETECTED
KETONES UR-MCNC: NORMAL
LEUKOCYTE ESTERASE UR QL STRIP: NORMAL
N GONORRHOEA RRNA SPEC QL NAA+PROBE: NOT DETECTED
NITRITE UR QL STRIP: NORMAL
PH UR STRIP: 6.5
PROT UR STRIP-MCNC: NORMAL
SOURCE TP AMPLIFICATION: NORMAL
SP GR UR STRIP: 1.01

## 2025-08-22 PROCEDURE — 88291 CYTO/MOLECULAR REPORT: CPT

## 2025-08-22 PROCEDURE — 59820 CARE OF MISCARRIAGE: CPT

## 2025-08-22 PROCEDURE — 88304 TISSUE EXAM BY PATHOLOGIST: CPT

## 2025-08-22 PROCEDURE — 88304 TISSUE EXAM BY PATHOLOGIST: CPT | Mod: 26

## 2025-08-22 RX ORDER — OXYCODONE HYDROCHLORIDE 30 MG/1
5 TABLET ORAL ONCE
Refills: 0 | Status: DISCONTINUED | OUTPATIENT
Start: 2025-08-22 | End: 2025-08-22

## 2025-08-22 RX ORDER — ACETAMINOPHEN 500 MG/5ML
1000 LIQUID (ML) ORAL ONCE
Refills: 0 | Status: DISCONTINUED | OUTPATIENT
Start: 2025-08-22 | End: 2025-08-22

## 2025-08-22 RX ORDER — ONDANSETRON HCL/PF 4 MG/2 ML
4 VIAL (ML) INJECTION ONCE
Refills: 0 | Status: DISCONTINUED | OUTPATIENT
Start: 2025-08-22 | End: 2025-08-22

## 2025-08-22 RX ORDER — HYDROMORPHONE/SOD CHLOR,ISO/PF 2 MG/10 ML
0.5 SYRINGE (ML) INJECTION
Refills: 0 | Status: DISCONTINUED | OUTPATIENT
Start: 2025-08-22 | End: 2025-08-22

## 2025-08-22 RX ORDER — HYDROMORPHONE/SOD CHLOR,ISO/PF 2 MG/10 ML
1 SYRINGE (ML) INJECTION
Refills: 0 | Status: DISCONTINUED | OUTPATIENT
Start: 2025-08-22 | End: 2025-08-22

## 2025-08-22 RX ORDER — OXYCODONE HYDROCHLORIDE 30 MG/1
10 TABLET ORAL ONCE
Refills: 0 | Status: DISCONTINUED | OUTPATIENT
Start: 2025-08-22 | End: 2025-08-22

## 2025-08-22 RX ORDER — SODIUM CHLORIDE 9 G/1000ML
500 INJECTION, SOLUTION INTRAVENOUS
Refills: 0 | Status: DISCONTINUED | OUTPATIENT
Start: 2025-08-22 | End: 2025-08-22

## 2025-08-25 LAB — SURGICAL PATHOLOGY STUDY: SIGNIFICANT CHANGE UP

## 2025-08-26 ENCOUNTER — APPOINTMENT (OUTPATIENT)
Dept: OBGYN | Facility: CLINIC | Age: 35
End: 2025-08-26

## 2025-08-28 DIAGNOSIS — O03.4 INCOMPLETE SPONTANEOUS ABORTION WITHOUT COMPLICATION: ICD-10-CM

## 2025-09-04 ENCOUNTER — APPOINTMENT (OUTPATIENT)
Dept: OBGYN | Facility: CLINIC | Age: 35
End: 2025-09-04
Payer: MEDICAID

## 2025-09-04 VITALS
DIASTOLIC BLOOD PRESSURE: 79 MMHG | HEART RATE: 80 BPM | SYSTOLIC BLOOD PRESSURE: 115 MMHG | BODY MASS INDEX: 28.7 KG/M2 | WEIGHT: 162 LBS

## 2025-09-04 DIAGNOSIS — O03.9 COMPLETE OR UNSPECIFIED SPONTANEOUS ABORTION W/OUT COMPLICATION: ICD-10-CM

## 2025-09-04 PROCEDURE — 99212 OFFICE O/P EST SF 10 MIN: CPT | Mod: TH,24,25

## 2025-09-04 PROCEDURE — 99459 PELVIC EXAMINATION: CPT

## 2025-09-04 PROCEDURE — 99024 POSTOP FOLLOW-UP VISIT: CPT
